# Patient Record
Sex: MALE | Race: WHITE | NOT HISPANIC OR LATINO | Employment: OTHER | URBAN - METROPOLITAN AREA
[De-identification: names, ages, dates, MRNs, and addresses within clinical notes are randomized per-mention and may not be internally consistent; named-entity substitution may affect disease eponyms.]

---

## 2017-02-06 RX ORDER — PIOGLITAZONEHYDROCHLORIDE 30 MG/1
TABLET ORAL
Qty: 90 TABLET | Refills: 3 | Status: ON HOLD | OUTPATIENT
Start: 2017-02-06 | End: 2017-11-17

## 2017-03-31 ENCOUNTER — OFFICE VISIT (OUTPATIENT)
Dept: INTERNAL MEDICINE | Facility: CLINIC | Age: 72
End: 2017-03-31

## 2017-03-31 VITALS
DIASTOLIC BLOOD PRESSURE: 96 MMHG | HEIGHT: 67 IN | SYSTOLIC BLOOD PRESSURE: 156 MMHG | HEART RATE: 79 BPM | WEIGHT: 197 LBS | OXYGEN SATURATION: 97 % | BODY MASS INDEX: 30.92 KG/M2 | TEMPERATURE: 97.8 F

## 2017-03-31 DIAGNOSIS — E11.9 NON-INSULIN DEPENDENT TYPE 2 DIABETES MELLITUS (HCC): ICD-10-CM

## 2017-03-31 DIAGNOSIS — Z00.00 MEDICARE ANNUAL WELLNESS VISIT, INITIAL: Primary | ICD-10-CM

## 2017-03-31 DIAGNOSIS — I10 ESSENTIAL HYPERTENSION: ICD-10-CM

## 2017-03-31 DIAGNOSIS — M25.532 PAIN IN BOTH WRISTS: ICD-10-CM

## 2017-03-31 DIAGNOSIS — E78.49 OTHER HYPERLIPIDEMIA: ICD-10-CM

## 2017-03-31 DIAGNOSIS — R35.1 NOCTURIA: ICD-10-CM

## 2017-03-31 DIAGNOSIS — M48.061 LUMBAR SPINAL STENOSIS: ICD-10-CM

## 2017-03-31 DIAGNOSIS — N52.8 OTHER MALE ERECTILE DYSFUNCTION: ICD-10-CM

## 2017-03-31 DIAGNOSIS — M25.531 PAIN IN BOTH WRISTS: ICD-10-CM

## 2017-03-31 PROCEDURE — 99214 OFFICE O/P EST MOD 30 MIN: CPT | Performed by: INTERNAL MEDICINE

## 2017-03-31 PROCEDURE — G0438 PPPS, INITIAL VISIT: HCPCS | Performed by: INTERNAL MEDICINE

## 2017-03-31 PROCEDURE — 90732 PPSV23 VACC 2 YRS+ SUBQ/IM: CPT | Performed by: INTERNAL MEDICINE

## 2017-03-31 PROCEDURE — G0009 ADMIN PNEUMOCOCCAL VACCINE: HCPCS | Performed by: INTERNAL MEDICINE

## 2017-03-31 RX ORDER — VALSARTAN 320 MG/1
320 TABLET ORAL DAILY
Qty: 90 TABLET | Refills: 3 | Status: SHIPPED | OUTPATIENT
Start: 2017-03-31 | End: 2017-11-20

## 2017-03-31 RX ORDER — VALSARTAN 160 MG/1
160 TABLET ORAL DAILY
Qty: 90 TABLET | Refills: 1 | Status: SHIPPED | OUTPATIENT
Start: 2017-03-31 | End: 2017-03-31

## 2017-03-31 NOTE — PROGRESS NOTES
QUICK REFERENCE INFORMATION:  The ABCs of the Annual Wellness Visit    Initial Medicare Wellness Visit    HEALTH RISK ASSESSMENT    1945    Recent Hospitalizations:  No recent hospitalization(s)..        Current Medical Providers:  Patient Care Team:  Brant Torres MD as PCP - General  Brant Torres MD as PCP - Family Medicine        Smoking Status:  History   Smoking Status   • Former Smoker   Smokeless Tobacco   • Not on file       Alcohol Consumption:  History   Alcohol Use No       Depression Screen:   PHQ-9 Depression Screening 3/31/2017   Little interest or pleasure in doing things 0   Feeling down, depressed, or hopeless 0   Trouble falling or staying asleep, or sleeping too much 0   Feeling tired or having little energy 0   Poor appetite or overeating 0   Feeling bad about yourself - or that you are a failure or have let yourself or your family down 0   Trouble concentrating on things, such as reading the newspaper or watching television 0   Moving or speaking so slowly that other people could have noticed. Or the opposite - being so fidgety or restless that you have been moving around a lot more than usual 0   Thoughts that you would be better off dead, or of hurting yourself in some way 0   PHQ-9 Total Score 0   If you checked off any problems, how difficult have these problems made it for you to do your work, take care of things at home, or get along with other people? Not difficult at all       Health Habits and Functional and Cognitive Screening:  Functional & Cognitive Status 3/31/2017   Do you have difficulty preparing food and eating? No   Do you have difficulty bathing yourself? No   Do you have difficulty getting dressed? No   Do you have difficulty using the toilet? No   Do you have difficulty moving around from place to place? No   In the past year have you fallen or experienced a near fall? No   Do you need help using the phone?  No   Are you deaf or do you have serious difficulty  hearing?  No   Do you need help with transportation? No   Do you need help shopping? No   Do you need help preparing meals?  No   Do you need help with housework?  No   Do you need help with laundry? No   Do you need help taking your medications? No   Do you need help managing money? No   Do you have difficulty concentrating, remembering or making decisions? No       Health Habits  Current Diet: Well Balanced Diet  Dental Exam: Up to date  Eye Exam: Up to date  Exercise (times per week): 4 times per week  Current Exercise Activities Include: Stationary Bicycling/Spin Class          Does the patient have evidence of cognitive impairment? No    Asprin use counseling:yes      Recent Lab Results:    Visual Acuity:  No exam data present    Age-appropriate Screening Schedule:  Refer to the list below for future screening recommendations based on patient's age, sex and/or medical conditions. Orders for these recommended tests are listed in the plan section. The patient has been provided with a written plan.    Health Maintenance   Topic Date Due   • TDAP/TD VACCINES (1 - Tdap) 01/15/1964   • ZOSTER VACCINE  02/10/2016   • PNEUMOCOCCAL VACCINES (65+ LOW/MEDIUM RISK) (2 of 2 - PPSV23) 10/12/2016   • HEMOGLOBIN A1C  05/23/2017   • DIABETIC FOOT EXAM  11/23/2017   • LIPID PANEL  11/23/2017   • DIABETIC EYE EXAM  11/23/2017   • URINE MICROALBUMIN  11/23/2017   • COLONOSCOPY  02/11/2021   • INFLUENZA VACCINE  Addressed        Subjective   History of Present Illness    Jenaro Haji Jr. is a 72 y.o. male who presents for an Annual Wellness Visit.    The following portions of the patient's history were reviewed and updated as appropriate: allergies, current medications, past family history, past medical history, past social history, past surgical history and problem list.    Outpatient Medications Prior to Visit   Medication Sig Dispense Refill   • atorvastatin (LIPITOR) 40 MG tablet TAKE 1 TABLET EVERY DAY AS DIRECTED 90 tablet  "2   • glipiZIDE (GLUCOTROL) 10 MG tablet TAKE 1 TABLET TWICE DAILY 180 tablet 2   • glucose blood test strip 1 each by Other route every day. Test glucose daily 100 each 3   • metFORMIN (GLUCOPHAGE) 1000 MG tablet TAKE 1 TABLET TWICE DAILY WITH MEALS 180 tablet 2   • pioglitazone (ACTOS) 30 MG tablet TAKE 1 TABLET BY MOUTH DAILY. 90 tablet 3   • sildenafil (REVATIO) 20 MG tablet Three to five pills daily as needed 30 tablet 2   • valsartan (DIOVAN) 160 MG tablet Take 1 tablet by mouth Daily. Indications: Make sure he has no allergies to this medication 90 tablet 1     No facility-administered medications prior to visit.        Patient Active Problem List   Diagnosis   • Knee pain   • Pain in both wrists   • Hypertension   • Hyperlipidemia   • Pain of left lower extremity   • Lumbar radiculopathy   • Nail biting   • Disuse syndrome   • Pain in wrist   • Non-insulin dependent type 2 diabetes mellitus   • Lumbar spinal stenosis   • Strain of left knee   • Knee injury   • History of fall   • Muscle spasms of neck   • Former smoker   • ED (erectile dysfunction)   • Medicare annual wellness visit, initial       Advanced Care Planning:  has an advanced directive - a copy has been provided and is in file    Identification of Risk Factors:  Risk factors include: weight  and cardiovascular risk.    Review of Systems   Respiratory: Negative for shortness of breath.    Cardiovascular: Negative for chest pain and palpitations.   Musculoskeletal: Negative for neck pain.   Neurological: Negative for headaches.       Compared to one year ago, the patient feels his physical health is worse.  Compared to one year ago, the patient feels his mental health is worse.    Objective     Physical Exam    Vitals:    03/31/17 1019   BP: 156/96   BP Location: Left arm   Patient Position: Sitting   Cuff Size: Adult   Pulse: 79   Temp: 97.8 °F (36.6 °C)   TempSrc: Tympanic   SpO2: 97%   Weight: 197 lb (89.4 kg)   Height: 67\" (170.2 cm)   PainSc:  "  2       Body mass index is 30.85 kg/(m^2).  Discussed the patient's BMI with him. The BMI is above average; BMI management plan is completed.    Assessment/Plan   Patient Self-Management and Personalized Health Advice  The patient has been provided with information about: diet, exercise, weight management and prevention of cardiac or vascular disease and preventive services including:   · Exercise counseling provided, Nutrition counseling provided.    Visit Diagnoses:    ICD-10-CM ICD-9-CM   1. Medicare annual wellness visit, initial Z00.00 V70.0   2. Essential hypertension I10 401.9   3. Other hyperlipidemia E78.4 272.4   4. Non-insulin dependent type 2 diabetes mellitus E11.9 250.00   5. Other male erectile dysfunction N52.8 607.84   6. Pain in both wrists M25.531 719.43    M25.532    7. Lumbar spinal stenosis M48.06 724.02       No orders of the defined types were placed in this encounter.      Outpatient Encounter Prescriptions as of 3/31/2017   Medication Sig Dispense Refill   • atorvastatin (LIPITOR) 40 MG tablet TAKE 1 TABLET EVERY DAY AS DIRECTED 90 tablet 2   • glipiZIDE (GLUCOTROL) 10 MG tablet TAKE 1 TABLET TWICE DAILY 180 tablet 2   • glucose blood test strip 1 each by Other route every day. Test glucose daily 100 each 3   • metFORMIN (GLUCOPHAGE) 1000 MG tablet TAKE 1 TABLET TWICE DAILY WITH MEALS 180 tablet 2   • pioglitazone (ACTOS) 30 MG tablet TAKE 1 TABLET BY MOUTH DAILY. 90 tablet 3   • sildenafil (REVATIO) 20 MG tablet Three to five pills daily as needed 30 tablet 2   • [DISCONTINUED] valsartan (DIOVAN) 160 MG tablet Take 1 tablet by mouth Daily. Indications: Make sure he has no allergies to this medication 90 tablet 1     No facility-administered encounter medications on file as of 3/31/2017.        Reviewed use of high risk medication in the elderly: yes  Reviewed for potential of harmful drug interactions in the elderly: yes    Follow Up:  No Follow-up on file.     An After Visit Summary and  PPPS with all of these plans were given to the patient.

## 2017-03-31 NOTE — PROGRESS NOTES
Subjective   Jenaro Haji Jr. is a 72 y.o. male.   He is here today for Medicare annual wellness visit initial along with hypertension NIDDM erectile dysfunction and pain in both wrist lumbar spinal stenosis nocturia  History of Present Illness   He is here today for Medicare annual wellness visit initial along with hypertension NIDDM rectal dysfunction and pain in both wrist along with lumbar spinal stenosis and nocturia  The following portions of the patient's history were reviewed and updated as appropriate: allergies, current medications, past family history, past medical history, past social history, past surgical history and problem list.    Review of Systems   Respiratory: Negative for shortness of breath.    Cardiovascular: Negative for chest pain and palpitations.   Musculoskeletal: Positive for arthralgias (ilateral wrist pain) and back pain. Negative for neck pain.   Neurological: Negative for headaches.   All other systems reviewed and are negative.      Objective   Physical Exam   Constitutional: He is oriented to person, place, and time. Vital signs are normal. He appears well-developed and well-nourished. He is active.   HENT:   Head: Normocephalic and atraumatic.   Right Ear: Hearing, tympanic membrane, external ear and ear canal normal.   Left Ear: Hearing, tympanic membrane, external ear and ear canal normal.   Nose: Nose normal.   Mouth/Throat: Uvula is midline, oropharynx is clear and moist and mucous membranes are normal.   Eyes: Conjunctivae, EOM and lids are normal. Pupils are equal, round, and reactive to light. Right eye exhibits no discharge. Left eye exhibits no discharge.   Neck: Trachea normal, normal range of motion, full passive range of motion without pain and phonation normal. Neck supple. Carotid bruit is not present. No edema present. No thyroid mass and no thyromegaly present.   Cardiovascular: Normal rate, regular rhythm, normal heart sounds, intact distal pulses and normal  pulses.  Exam reveals no gallop and no friction rub.    No murmur heard.  Pulmonary/Chest: Effort normal and breath sounds normal. No respiratory distress. He has no wheezes. He has no rales.   Abdominal: Soft. Normal appearance, normal aorta and bowel sounds are normal. He exhibits no distension, no abdominal bruit and no mass. There is no hepatosplenomegaly. There is no tenderness. There is no rebound, no guarding and no CVA tenderness. No hernia. Hernia confirmed negative in the right inguinal area and confirmed negative in the left inguinal area.   Musculoskeletal: Normal range of motion. He exhibits no edema or tenderness.       Vascular Status -  His exam exhibits right foot vasculature normal. His exam exhibits no right foot edema. His exam exhibits left foot vasculature normal. His exam exhibits no left foot edema.   Skin Integrity  -  His right foot skin is intact.     Eason 's left foot skin is intact. .  Lymphadenopathy:     He has no cervical adenopathy.     He has no axillary adenopathy.        Right: No inguinal and no supraclavicular adenopathy present.        Left: No inguinal and no supraclavicular adenopathy present.   Neurological: He is alert and oriented to person, place, and time. He has normal strength. No cranial nerve deficit or sensory deficit. He exhibits normal muscle tone. He displays a negative Romberg sign. Coordination normal.   Skin: Skin is warm, dry and intact. No cyanosis. Nails show no clubbing.   Psychiatric: He has a normal mood and affect. His speech is normal and behavior is normal. Judgment and thought content normal. Cognition and memory are normal.   Nursing note and vitals reviewed.      Assessment/Plan   Diagnoses and all orders for this visit:    Medicare annual wellness visit, initial  -     Lipid Panel With LDL / HDL Ratio  -     Hemoglobin A1c  -     MicroAlbumin, Urine, Random  -     TSH  -     T4, Free  -     Urinalysis With Microscopic  -     Comprehensive  Metabolic Panel  -     CBC & Differential    Essential hypertension  -     Lipid Panel With LDL / HDL Ratio  -     Hemoglobin A1c  -     MicroAlbumin, Urine, Random  -     TSH  -     T4, Free  -     Urinalysis With Microscopic  -     Comprehensive Metabolic Panel  -     CBC & Differential    Other hyperlipidemia  -     Lipid Panel With LDL / HDL Ratio  -     Hemoglobin A1c  -     MicroAlbumin, Urine, Random  -     TSH  -     T4, Free  -     Urinalysis With Microscopic  -     Comprehensive Metabolic Panel  -     CBC & Differential    Non-insulin dependent type 2 diabetes mellitus  -     Lipid Panel With LDL / HDL Ratio  -     Hemoglobin A1c  -     MicroAlbumin, Urine, Random  -     TSH  -     T4, Free  -     Urinalysis With Microscopic  -     Comprehensive Metabolic Panel  -     CBC & Differential    Other male erectile dysfunction  -     Lipid Panel With LDL / HDL Ratio  -     Hemoglobin A1c  -     MicroAlbumin, Urine, Random  -     TSH  -     T4, Free  -     Urinalysis With Microscopic  -     Comprehensive Metabolic Panel  -     CBC & Differential    Pain in both wrists  -     Lipid Panel With LDL / HDL Ratio  -     Hemoglobin A1c  -     MicroAlbumin, Urine, Random  -     TSH  -     T4, Free  -     Urinalysis With Microscopic  -     Comprehensive Metabolic Panel  -     CBC & Differential    Lumbar spinal stenosis  -     Lipid Panel With LDL / HDL Ratio  -     Hemoglobin A1c  -     MicroAlbumin, Urine, Random  -     TSH  -     T4, Free  -     Urinalysis With Microscopic  -     Comprehensive Metabolic Panel  -     CBC & Differential    Nocturia  -     PSA    Other orders  -     valsartan (DIOVAN) 320 MG tablet; Take 1 tablet by mouth Daily. Indications: Make sure he has no allergies to this medication  -     Pneumococcal Polysaccharide Vaccine 23-Valent Greater Than or Equal To 1yo Subcutaneous / IM      Medicare annual is initial follow recommendation  Lumbar spinal stenosis supportive meds physical  therapy  Nocturia check PSA  Pain in both wrists follow with hand surgery  Erectile dysfunction supportive meds  NIDDM follow hemoglobin A1 C left most visits  Hyperlipidemia keep LDL less than 70 with proper diet excess movement medication   hypertension medication adjustment

## 2017-04-01 LAB
ALBUMIN SERPL-MCNC: 4.7 G/DL (ref 3.5–5.2)
ALBUMIN/GLOB SERPL: 2.2 G/DL
ALP SERPL-CCNC: 41 U/L (ref 39–117)
ALT SERPL-CCNC: 22 U/L (ref 1–41)
APPEARANCE UR: CLEAR
AST SERPL-CCNC: 23 U/L (ref 1–40)
BACTERIA #/AREA URNS HPF: NORMAL /HPF
BASOPHILS # BLD AUTO: 0.04 10*3/MM3 (ref 0–0.2)
BASOPHILS NFR BLD AUTO: 0.8 % (ref 0–1.5)
BILIRUB SERPL-MCNC: 0.6 MG/DL (ref 0.1–1.2)
BILIRUB UR QL STRIP: NEGATIVE
BUN SERPL-MCNC: 15 MG/DL (ref 8–23)
BUN/CREAT SERPL: 18.1 (ref 7–25)
CALCIUM SERPL-MCNC: 9 MG/DL (ref 8.6–10.5)
CASTS URNS MICRO: NORMAL
CHLORIDE SERPL-SCNC: 100 MMOL/L (ref 98–107)
CHOLEST SERPL-MCNC: 139 MG/DL (ref 0–200)
CO2 SERPL-SCNC: 26.9 MMOL/L (ref 22–29)
COLOR UR: YELLOW
CREAT SERPL-MCNC: 0.83 MG/DL (ref 0.76–1.27)
EOSINOPHIL # BLD AUTO: 0.2 10*3/MM3 (ref 0–0.7)
EOSINOPHIL NFR BLD AUTO: 4.1 % (ref 0.3–6.2)
EPI CELLS #/AREA URNS HPF: NORMAL /HPF
ERYTHROCYTE [DISTWIDTH] IN BLOOD BY AUTOMATED COUNT: 14 % (ref 11.5–14.5)
GLOBULIN SER CALC-MCNC: 2.1 GM/DL
GLUCOSE SERPL-MCNC: 129 MG/DL (ref 65–99)
GLUCOSE UR QL: NEGATIVE
HBA1C MFR BLD: 6.2 % (ref 4.8–5.6)
HCT VFR BLD AUTO: 42.9 % (ref 40.4–52.2)
HDLC SERPL-MCNC: 57 MG/DL (ref 40–60)
HGB BLD-MCNC: 13.9 G/DL (ref 13.7–17.6)
HGB UR QL STRIP: NEGATIVE
IMM GRANULOCYTES # BLD: 0 10*3/MM3 (ref 0–0.03)
IMM GRANULOCYTES NFR BLD: 0 % (ref 0–0.5)
KETONES UR QL STRIP: NEGATIVE
LDLC SERPL CALC-MCNC: 63 MG/DL (ref 0–100)
LDLC/HDLC SERPL: 1.1 {RATIO}
LEUKOCYTE ESTERASE UR QL STRIP: NEGATIVE
LYMPHOCYTES # BLD AUTO: 1.4 10*3/MM3 (ref 0.9–4.8)
LYMPHOCYTES NFR BLD AUTO: 28.6 % (ref 19.6–45.3)
MCH RBC QN AUTO: 30.7 PG (ref 27–32.7)
MCHC RBC AUTO-ENTMCNC: 32.4 G/DL (ref 32.6–36.4)
MCV RBC AUTO: 94.7 FL (ref 79.8–96.2)
MICROALBUMIN UR-MCNC: <3 UG/ML
MONOCYTES # BLD AUTO: 0.35 10*3/MM3 (ref 0.2–1.2)
MONOCYTES NFR BLD AUTO: 7.1 % (ref 5–12)
NEUTROPHILS # BLD AUTO: 2.91 10*3/MM3 (ref 1.9–8.1)
NEUTROPHILS NFR BLD AUTO: 59.4 % (ref 42.7–76)
NITRITE UR QL STRIP: NEGATIVE
PH UR STRIP: 5.5 [PH] (ref 5–8)
PLATELET # BLD AUTO: 227 10*3/MM3 (ref 140–500)
POTASSIUM SERPL-SCNC: 4.6 MMOL/L (ref 3.5–5.2)
PROT SERPL-MCNC: 6.8 G/DL (ref 6–8.5)
PROT UR QL STRIP: NEGATIVE
PSA SERPL-MCNC: 0.85 NG/ML (ref 0–4)
RBC # BLD AUTO: 4.53 10*6/MM3 (ref 4.6–6)
RBC #/AREA URNS HPF: NORMAL /HPF
SODIUM SERPL-SCNC: 140 MMOL/L (ref 136–145)
SP GR UR: 1.02 (ref 1–1.03)
T4 FREE SERPL-MCNC: 1.13 NG/DL (ref 0.93–1.7)
TRIGL SERPL-MCNC: 96 MG/DL (ref 0–150)
TSH SERPL DL<=0.005 MIU/L-ACNC: 6.07 MIU/ML (ref 0.27–4.2)
UROBILINOGEN UR STRIP-MCNC: (no result) MG/DL
VLDLC SERPL CALC-MCNC: 19.2 MG/DL (ref 5–40)
WBC # BLD AUTO: 4.9 10*3/MM3 (ref 4.5–10.7)
WBC #/AREA URNS HPF: NORMAL /HPF

## 2017-04-03 RX ORDER — ATORVASTATIN CALCIUM 40 MG/1
TABLET, FILM COATED ORAL
Qty: 90 TABLET | Refills: 2 | Status: ON HOLD | OUTPATIENT
Start: 2017-04-03 | End: 2017-11-17

## 2017-06-19 RX ORDER — GLIPIZIDE 10 MG/1
TABLET ORAL
Qty: 180 TABLET | Refills: 2 | Status: SHIPPED | OUTPATIENT
Start: 2017-06-19

## 2017-07-10 RX ORDER — BLOOD-GLUCOSE METER
KIT MISCELLANEOUS
Qty: 100 EACH | Refills: 3 | Status: SHIPPED | OUTPATIENT
Start: 2017-07-10

## 2017-10-20 ENCOUNTER — TRANSCRIBE ORDERS (OUTPATIENT)
Dept: GASTROENTEROLOGY | Facility: CLINIC | Age: 72
End: 2017-10-20

## 2017-10-20 DIAGNOSIS — Z86.010 HX OF COLONIC POLYPS: Primary | ICD-10-CM

## 2017-10-24 PROBLEM — Z86.0100 HX OF COLONIC POLYPS: Status: ACTIVE | Noted: 2017-10-24

## 2017-10-24 PROBLEM — Z86.010 HX OF COLONIC POLYPS: Status: ACTIVE | Noted: 2017-10-24

## 2017-11-17 ENCOUNTER — ANESTHESIA (OUTPATIENT)
Dept: GASTROENTEROLOGY | Facility: HOSPITAL | Age: 72
End: 2017-11-17

## 2017-11-17 ENCOUNTER — ANESTHESIA EVENT (OUTPATIENT)
Dept: GASTROENTEROLOGY | Facility: HOSPITAL | Age: 72
End: 2017-11-17

## 2017-11-17 ENCOUNTER — HOSPITAL ENCOUNTER (OUTPATIENT)
Facility: HOSPITAL | Age: 72
Setting detail: HOSPITAL OUTPATIENT SURGERY
Discharge: HOME | End: 2017-11-17
Attending: INTERNAL MEDICINE | Admitting: INTERNAL MEDICINE

## 2017-11-17 VITALS
HEART RATE: 90 BPM | HEIGHT: 67 IN | RESPIRATION RATE: 16 BRPM | TEMPERATURE: 97.7 F | SYSTOLIC BLOOD PRESSURE: 123 MMHG | WEIGHT: 178 LBS | BODY MASS INDEX: 27.94 KG/M2 | OXYGEN SATURATION: 98 % | DIASTOLIC BLOOD PRESSURE: 80 MMHG

## 2017-11-17 DIAGNOSIS — Z86.010 HX OF COLONIC POLYPS: ICD-10-CM

## 2017-11-17 LAB — GLUCOSE BLDC GLUCOMTR-MCNC: 177 MG/DL (ref 70–130)

## 2017-11-17 PROCEDURE — 82962 GLUCOSE BLOOD TEST: CPT

## 2017-11-17 PROCEDURE — 25010000002 PROPOFOL 10 MG/ML EMULSION: Performed by: ANESTHESIOLOGY

## 2017-11-17 PROCEDURE — 88305 TISSUE EXAM BY PATHOLOGIST: CPT | Performed by: INTERNAL MEDICINE

## 2017-11-17 PROCEDURE — 45380 COLONOSCOPY AND BIOPSY: CPT | Performed by: INTERNAL MEDICINE

## 2017-11-17 PROCEDURE — S0260 H&P FOR SURGERY: HCPCS | Performed by: INTERNAL MEDICINE

## 2017-11-17 RX ORDER — SODIUM CHLORIDE 0.9 % (FLUSH) 0.9 %
1-10 SYRINGE (ML) INJECTION AS NEEDED
Status: DISCONTINUED | OUTPATIENT
Start: 2017-11-17 | End: 2017-11-17 | Stop reason: HOSPADM

## 2017-11-17 RX ORDER — PROPOFOL 10 MG/ML
VIAL (ML) INTRAVENOUS AS NEEDED
Status: DISCONTINUED | OUTPATIENT
Start: 2017-11-17 | End: 2017-11-17 | Stop reason: SURG

## 2017-11-17 RX ORDER — SODIUM CHLORIDE, SODIUM LACTATE, POTASSIUM CHLORIDE, CALCIUM CHLORIDE 600; 310; 30; 20 MG/100ML; MG/100ML; MG/100ML; MG/100ML
30 INJECTION, SOLUTION INTRAVENOUS CONTINUOUS PRN
Status: DISCONTINUED | OUTPATIENT
Start: 2017-11-17 | End: 2017-11-17 | Stop reason: HOSPADM

## 2017-11-17 RX ORDER — LIDOCAINE HYDROCHLORIDE 20 MG/ML
INJECTION, SOLUTION INFILTRATION; PERINEURAL AS NEEDED
Status: DISCONTINUED | OUTPATIENT
Start: 2017-11-17 | End: 2017-11-17 | Stop reason: SURG

## 2017-11-17 RX ORDER — PROPOFOL 10 MG/ML
VIAL (ML) INTRAVENOUS CONTINUOUS PRN
Status: DISCONTINUED | OUTPATIENT
Start: 2017-11-17 | End: 2017-11-17 | Stop reason: SURG

## 2017-11-17 RX ORDER — LEVOTHYROXINE SODIUM 0.05 MG/1
50 TABLET ORAL DAILY
COMMUNITY
End: 2018-01-17 | Stop reason: SDUPTHER

## 2017-11-17 RX ORDER — ASPIRIN 81 MG/1
81 TABLET ORAL DAILY
COMMUNITY

## 2017-11-17 RX ORDER — ATORVASTATIN CALCIUM 40 MG/1
40 TABLET, FILM COATED ORAL DAILY
COMMUNITY

## 2017-11-17 RX ORDER — LANOLIN ALCOHOL/MO/W.PET/CERES
100 CREAM (GRAM) TOPICAL DAILY
COMMUNITY

## 2017-11-17 RX ADMIN — PROPOFOL 100 MCG/KG/MIN: 10 INJECTION, EMULSION INTRAVENOUS at 08:57

## 2017-11-17 RX ADMIN — LIDOCAINE HYDROCHLORIDE 60 MG: 20 INJECTION, SOLUTION INFILTRATION; PERINEURAL at 08:57

## 2017-11-17 RX ADMIN — SODIUM CHLORIDE, POTASSIUM CHLORIDE, SODIUM LACTATE AND CALCIUM CHLORIDE 30 ML/HR: 600; 310; 30; 20 INJECTION, SOLUTION INTRAVENOUS at 08:18

## 2017-11-17 RX ADMIN — PROPOFOL 100 MG: 10 INJECTION, EMULSION INTRAVENOUS at 08:57

## 2017-11-17 NOTE — H&P
[facility]  Pre Procedure History & Physical    Chief Complaint:   History colon polyps    Subjective     HPI:   Patient 72-year-old male with history diabetes hyperlipidemia hypertension with history of colon polyps.  Patient reports last colonoscopy 2011 here for colonoscopy.    Past Medical History:   Past Medical History:   Diagnosis Date   • Diabetes mellitus    • Hyperlipidemia    • Hypertension    • Lumbar spinal stenosis    • Nocturia    • Non-insulin dependent type 2 diabetes mellitus    • Pain in both wrists        Past Surgical History:  Past Surgical History:   Procedure Laterality Date   • COLONOSCOPY  03/18/2011   • HERNIA REPAIR     • INGUINAL HERNIA REPAIR         Family History:  Family History   Problem Relation Age of Onset   • Cancer Mother    • Aortic aneurysm Father      Abdominal   • Coronary artery disease Father        Social History:   reports that he has quit smoking. He has never used smokeless tobacco. He reports that he drinks alcohol. He reports that he does not use illicit drugs.    Medications:   Prescriptions Prior to Admission   Medication Sig Dispense Refill Last Dose   • aspirin 81 MG EC tablet Take 81 mg by mouth Daily.   11/15/2017   • atorvastatin (LIPITOR) 40 MG tablet Take 40 mg by mouth Daily.   11/16/2017 at 0900   • glipiZIDE (GLUCOTROL) 10 MG tablet TAKE 1 TABLET TWICE DAILY 180 tablet 2 11/16/2017 at 0900   • levothyroxine (SYNTHROID, LEVOTHROID) 50 MCG tablet Take 50 mcg by mouth Daily.   11/16/2017 at 0900   • metFORMIN (GLUCOPHAGE) 1000 MG tablet TAKE 1 TABLET TWICE DAILY WITH MEALS 180 tablet 2 11/16/2017 at 0900   • Multiple Vitamins-Minerals (CENTRUM SILVER 50+MEN) tablet Take  by mouth.   11/15/2017   • RELION CONFIRM/MICRO TEST test strip CHECK BLOOD SUGAR ONCE DAILY 100 each 3 11/16/2017 at Unknown time   • valsartan (DIOVAN) 320 MG tablet Take 1 tablet by mouth Daily. Indications: Make sure he has no allergies to this medication 90  "tablet 3 11/16/2017 at 0900   • vitamin B-6 (PYRIDOXINE) 50 MG tablet Take 100 mg by mouth Daily.   11/15/2017       Allergies:  Blue dyes (parenteral); Contrast dye; and Red dye    ROS:    Pertinent items are noted in HPI     Objective     Blood pressure 144/98, pulse 89, temperature 98.1 °F (36.7 °C), temperature source Oral, resp. rate 16, height 67\" (170.2 cm), weight 178 lb (80.7 kg), SpO2 97 %.    Physical Exam   Constitutional: Pt is oriented to person, place, and time and well-developed, well-nourished, and in no distress.   Mouth/Throat: Oropharynx is clear and moist.   Neck: Normal range of motion.   Cardiovascular: Normal rate, regular rhythm and normal heart sounds.    Pulmonary/Chest: Effort normal and breath sounds normal.   Abdominal: Soft. Nontender  Skin: Skin is warm and dry.   Psychiatric: Mood, memory, affect and judgment normal.     Assessment/Plan     Diagnosis:  History colon polyps      Anticipated Surgical Procedure:  Colonoscopy    The risks, benefits, and alternatives of this procedure have been discussed with the patient or the responsible party- the patient understands and agrees to proceed.                                                          "

## 2017-11-17 NOTE — ANESTHESIA PREPROCEDURE EVALUATION
Anesthesia Evaluation            Airway   Mallampati: III  TM distance: >3 FB  Neck ROM: full  no difficulty expected  Dental - normal exam     Pulmonary - normal exam   Cardiovascular - normal exam    (+) hypertension,       Neuro/Psych  GI/Hepatic/Renal/Endo    (+)  diabetes mellitus type 2,     Musculoskeletal     Abdominal  - normal exam    Bowel sounds: normal.   Substance History      OB/GYN          Other                                        Anesthesia Plan    ASA 3     MAC     Anesthetic plan and risks discussed with patient.

## 2017-11-17 NOTE — BRIEF OP NOTE
COLONOSCOPY  Progress Note    Jenaro Haji Jr.  11/17/2017    Pre-op Diagnosis:   Hx of colonic polyps [Z86.010]       Post-Op Diagnosis Codes:     * Colon polyps [K63.5]     * Diverticulosis [K57.90]     * External hemorrhoids without complication [K64.4]    Procedure/CPT® Codes:      Procedure(s):  COLONOSCOPY TO CECUM/TI WITH POLYPECTOMY ( COLD BX)    Surgeon(s):  Kenan Galindo MD    Anesthesia: Monitor Anesthesia Care    Staff:   Endo Technician: Shadia Mancini  Orientee: Zari Marie  Endo Nurse: Angelia Valentin RN    Estimated Blood Loss: minimal    Urine Voided: * No values recorded between 11/17/2017  8:57 AM and 11/17/2017  9:30 AM *    Specimens:                  ID Type Source Tests Collected by Time Destination   A : TRANSVERSE COLON POLYPS X3( COLD BX) Polyp Large Intestine, Transverse Colon TISSUE EXAM Kenan Galindo MD 11/17/2017 0920          Drains:           Findings: 72 male history colon polyps  Transverse polyps  Diverticulosis  External hemorrhoids    Complications: None      Kenan Galindo MD     Date: 11/17/2017  Time: 9:30 AM

## 2017-11-17 NOTE — DISCHARGE INSTRUCTIONS
If you do not get your pathology results within 2 weeks, call Dr. Galindo at [phone]  Repeat colonoscopy in 5 years

## 2017-11-17 NOTE — ANESTHESIA POSTPROCEDURE EVALUATION
"Patient: Jenaro Haji Jr.    Procedure Summary     Date Anesthesia Start Anesthesia Stop Room / Location    11/17/17 0855 0935  [facility]       Procedure Diagnosis Surgeon Provider    COLONOSCOPY TO CECUM/TI WITH POLYPECTOMY ( COLD BX) (N/A ) Colon polyps; Diverticulosis; External hemorrhoids without complication  (Hx of colonic polyps [Z86.010]) MD Vonnie Amezquita MD          Anesthesia Type: MAC  Last vitals  BP   117/87 (11/17/17 0933)   Temp   36.7 °C (98.1 °F) (11/17/17 0801)   Pulse   99 (11/17/17 0933)   Resp   15 (11/17/17 0933)     SpO2   99 % (11/17/17 0933)     Post Anesthesia Care and Evaluation    Patient location during evaluation: bedside  Patient participation: complete - patient participated  Level of consciousness: awake  Pain management: adequate  Airway patency: patent  Anesthetic complications: No anesthetic complications    Cardiovascular status: acceptable  Respiratory status: acceptable  Hydration status: acceptable    Comments: /87 (BP Location: Left arm, Patient Position: Lying)  Pulse 99  Temp 36.7 °C (98.1 °F) (Oral)   Resp 15  Ht 67\" (170.2 cm)  Wt 178 lb (80.7 kg)  SpO2 99%  BMI 27.88 kg/m2        "

## 2017-11-17 NOTE — PLAN OF CARE
Problem: Patient Care Overview (Adult)  Goal: Plan of Care Review  Outcome: Ongoing (interventions implemented as appropriate)    11/17/17 0758   Coping/Psychosocial Response Interventions   Plan Of Care Reviewed With patient   Patient Care Overview   Progress improving   Outcome Evaluation   Outcome Summary/Follow up Plan vss, ready for or       Goal: Adult Individualization and Mutuality  Outcome: Ongoing (interventions implemented as appropriate)    11/17/17 0758   Individualization   Patient Specific Preferences goes by skip       Goal: Discharge Needs Assessment  Outcome: Ongoing (interventions implemented as appropriate)    11/17/17 0758   Discharge Needs Assessment   Concerns To Be Addressed denies needs/concerns at this time   Discharge Disposition home or self-care   Living Environment   Transportation Available car;family or friend will provide         Problem: GI Endoscopy (Adult)  Intervention: Monitor/Manage Procedure Recovery    11/17/17 0758   Respiratory Interventions   Airway/Ventilation Management airway patency maintained   Coping/Psychosocial Interventions   Environmental Support calm environment promoted   Activity   Activity Type activity adjusted per tolerance   Cardiac Interventions   Warming Thermoregulation Maintenance warm blankets applied       Intervention: Prevent Crystal-procedural Injury    11/17/17 0758   Positioning   Positioning side lying, left   Head Of Bed (HOB) Position HOB elevated         Goal: Signs and Symptoms of Listed Potential Problems Will be Absent or Manageable (GI Endoscopy)  Outcome: Ongoing (interventions implemented as appropriate)    11/17/17 0758   GI Endoscopy   Problems Assessed (GI Endoscopy) all   Problems Present (GI Endoscopy) none

## 2017-11-20 ENCOUNTER — OFFICE VISIT (OUTPATIENT)
Dept: CARDIOLOGY | Facility: CLINIC | Age: 72
End: 2017-11-20

## 2017-11-20 VITALS
HEIGHT: 67 IN | BODY MASS INDEX: 28.09 KG/M2 | DIASTOLIC BLOOD PRESSURE: 80 MMHG | WEIGHT: 179 LBS | SYSTOLIC BLOOD PRESSURE: 130 MMHG | HEART RATE: 93 BPM

## 2017-11-20 DIAGNOSIS — E78.49 OTHER HYPERLIPIDEMIA: ICD-10-CM

## 2017-11-20 DIAGNOSIS — R06.09 DYSPNEA ON EXERTION: ICD-10-CM

## 2017-11-20 DIAGNOSIS — E11.9 NON-INSULIN DEPENDENT TYPE 2 DIABETES MELLITUS (HCC): ICD-10-CM

## 2017-11-20 DIAGNOSIS — I10 ESSENTIAL HYPERTENSION: Primary | ICD-10-CM

## 2017-11-20 DIAGNOSIS — R53.83 FATIGUE, UNSPECIFIED TYPE: ICD-10-CM

## 2017-11-20 LAB
CYTO UR: NORMAL
LAB AP CASE REPORT: NORMAL
Lab: NORMAL
PATH REPORT.FINAL DX SPEC: NORMAL
PATH REPORT.GROSS SPEC: NORMAL

## 2017-11-20 PROCEDURE — 99204 OFFICE O/P NEW MOD 45 MIN: CPT | Performed by: INTERNAL MEDICINE

## 2017-11-20 PROCEDURE — 93000 ELECTROCARDIOGRAM COMPLETE: CPT | Performed by: INTERNAL MEDICINE

## 2017-11-20 RX ORDER — VALSARTAN AND HYDROCHLOROTHIAZIDE 320; 25 MG/1; MG/1
1 TABLET, FILM COATED ORAL DAILY
COMMUNITY
End: 2019-12-19

## 2017-11-20 NOTE — PROGRESS NOTES
Date of Office Visit: 2017  Encounter Provider: Titi Richardson MD  Place of Service: [facility] CARDIOLOGY  Patient Name: Jenaro Haji Jr.  :1945  [id number]    Chief Complaint   Patient presents with   • Hypertension   • Hyperlipidemia   :     HPI: Jenaro Haji Jr. is a 72 y.o. male  He is here as a new patient today.  He is a 72-year-old gentleman, a former smoker who stopped 10 years ago.  He has diabetes which is pretty well controlled.  He has hyperlipidemia which is very well controlled and hypertension which is well controlled.  He has never had coronary disease.  He gets some fatigue and a little bit of shortness of breath when he is cutting the grass or doing things, but he is not sure if that is just age related or if it is more than that.  He does not have angina.  He is not having syncope, PND, orthopnea, edema.  He does not have any history of lung, kidney problems or bleeding difficulty.          Past Medical History:   Diagnosis Date   • Chronic kidney disease    • Diabetes mellitus    • Hyperlipidemia    • Hypertension    • Kidney stone    • Lumbar spinal stenosis    • Nocturia    • Non-insulin dependent type 2 diabetes mellitus    • Pain in both wrists    • Thyroid disease        Past Surgical History:   Procedure Laterality Date   • COLONOSCOPY  2011   • COLONOSCOPY N/A 2017    Procedure: COLONOSCOPY TO CECUM/TI WITH POLYPECTOMY ( COLD BX);  Surgeon: Kenan Galindo MD;  Location: [facility] ENDOSCOPY;  Service:    • HERNIA REPAIR     • INGUINAL HERNIA REPAIR         Social History     Social History   • Marital status:      Spouse name: N/A   • Number of children: N/A   • Years of education: N/A     Occupational History   • Not on file.     Social History Main Topics   • Smoking status: Former Smoker   • Smokeless tobacco: Never Used   • Alcohol use Yes      Comment: weekly - moderate   • Drug use: No   • Sexual activity: Defer      Other Topics Concern   • Not on file     Social History Narrative       Family History   Problem Relation Age of Onset   • Cancer Mother    • Diabetes Mother    • Aortic aneurysm Father      Abdominal   • Coronary artery disease Father    • Heart attack Father    • Hypertension Father    • Sudden death Father    • Stroke Paternal Uncle        Review of Systems   Constitution: Negative for decreased appetite, fever, malaise/fatigue and weight loss.   HENT: Negative for nosebleeds.    Eyes: Negative for double vision.   Cardiovascular: Negative for chest pain, claudication, cyanosis, dyspnea on exertion, irregular heartbeat, leg swelling, near-syncope, orthopnea, palpitations, paroxysmal nocturnal dyspnea and syncope.   Respiratory: Negative for cough, hemoptysis and shortness of breath.    Hematologic/Lymphatic: Negative for bleeding problem.   Skin: Negative for rash.   Musculoskeletal: Negative for falls and myalgias.   Gastrointestinal: Negative for hematochezia, jaundice, melena, nausea and vomiting.   Genitourinary: Negative for hematuria.   Neurological: Negative for dizziness and seizures.   Psychiatric/Behavioral: Negative for altered mental status and memory loss.       Allergies   Allergen Reactions   • Blue Dyes (Parenteral)    • Contrast Dye    • Red Dye          Current Outpatient Prescriptions:   •  aspirin 81 MG EC tablet, Take 81 mg by mouth Daily., Disp: , Rfl:   •  atorvastatin (LIPITOR) 40 MG tablet, Take 40 mg by mouth Daily., Disp: , Rfl:   •  glipiZIDE (GLUCOTROL) 10 MG tablet, TAKE 1 TABLET TWICE DAILY, Disp: 180 tablet, Rfl: 2  •  levothyroxine (SYNTHROID, LEVOTHROID) 50 MCG tablet, Take 50 mcg by mouth Daily., Disp: , Rfl:   •  metFORMIN (GLUCOPHAGE) 1000 MG tablet, TAKE 1 TABLET TWICE DAILY WITH MEALS, Disp: 180 tablet, Rfl: 2  •  Multiple Vitamins-Minerals (CENTRUM SILVER 50+MEN) tablet, Take  by mouth., Disp: , Rfl:   •  RELION CONFIRM/MICRO TEST test strip, CHECK BLOOD SUGAR ONCE  "DAILY, Disp: 100 each, Rfl: 3  •  valsartan-hydrochlorothiazide (DIOVAN-HCT) 320-25 MG per tablet, Take 1 tablet by mouth Daily., Disp: , Rfl:   •  vitamin B-6 (PYRIDOXINE) 50 MG tablet, Take 100 mg by mouth Daily., Disp: , Rfl:      Objective:     Vitals:    11/20/17 1529   BP: 130/80   Pulse: 93   Weight: 179 lb (81.2 kg)   Height: 67\" (170.2 cm)     Body mass index is 28.04 kg/(m^2).    Physical Exam   Constitutional: He is oriented to person, place, and time. He appears well-developed and well-nourished.   HENT:   Head: Normocephalic.   Eyes: No scleral icterus.   Neck: No JVD present. No thyromegaly present.   Cardiovascular: Normal rate, regular rhythm and normal heart sounds.  Exam reveals no gallop and no friction rub.    No murmur heard.  Pulmonary/Chest: Effort normal and breath sounds normal. He has no wheezes. He has no rales.   Abdominal: Soft. There is no hepatosplenomegaly. There is no tenderness.   Musculoskeletal: Normal range of motion. He exhibits no edema.   Lymphadenopathy:     He has no cervical adenopathy.   Neurological: He is alert and oriented to person, place, and time.   Skin: Skin is warm and dry. No rash noted.   Psychiatric: He has a normal mood and affect.         ECG 12 Lead  Date/Time: 11/20/2017 4:08 PM  Performed by: JOANNA CHRISTINA  Authorized by: JOANNA CHRISTINA   Rhythm: sinus rhythm  Clinical impression: normal ECG           No prior to compare  Assessment:       Diagnosis Plan   1. Essential hypertension  Treadmill Stress Test   2. Other hyperlipidemia  Treadmill Stress Test   3. Non-insulin dependent type 2 diabetes mellitus  Treadmill Stress Test   4. Dyspnea on exertion  Treadmill Stress Test   5. Fatigue, unspecified type  Treadmill Stress Test          Plan:       This is always a difficult situation.  This is a gentleman who has multiple cardiac risk factors, some fatigue, and some shortness of breath.  This could be coronary disease but it might not be.  It is hard to " tell.  I do not think that it is unreasonable to get a regular treadmill stress test on him.  His ECG is normal.  His activity level is decent.  We will see how he does and we will base further decisions on what we find with that.         As always, it has been a pleasure to participate in your patient's care.      Sincerely,       Titi Richardson MD                  Answers for HPI/ROS submitted by the patient on 11/15/2017   Hypertension  Chronicity: chronic  Onset: more than 1 month ago  Progression since onset: resolved  Condition status: controlled  Agents associated with hypertension: NSAIDs, thyroid hormones  CAD risks: diabetes mellitus  Compliance problems: no compliance problems

## 2017-12-07 ENCOUNTER — HOSPITAL ENCOUNTER (OUTPATIENT)
Dept: CARDIOLOGY | Facility: HOSPITAL | Age: 72
Discharge: HOME | End: 2017-12-07
Attending: INTERNAL MEDICINE | Admitting: INTERNAL MEDICINE

## 2017-12-07 DIAGNOSIS — E11.9 NON-INSULIN DEPENDENT TYPE 2 DIABETES MELLITUS (HCC): ICD-10-CM

## 2017-12-07 DIAGNOSIS — R53.83 FATIGUE, UNSPECIFIED TYPE: ICD-10-CM

## 2017-12-07 DIAGNOSIS — I10 ESSENTIAL HYPERTENSION: ICD-10-CM

## 2017-12-07 DIAGNOSIS — E78.49 OTHER HYPERLIPIDEMIA: ICD-10-CM

## 2017-12-07 DIAGNOSIS — R06.09 DYSPNEA ON EXERTION: ICD-10-CM

## 2017-12-07 LAB
BH CV STRESS BP STAGE 1: NORMAL
BH CV STRESS BP STAGE 2: NORMAL
BH CV STRESS BP STAGE 3: NORMAL
BH CV STRESS DURATION MIN STAGE 1: 3
BH CV STRESS DURATION MIN STAGE 2: 3
BH CV STRESS DURATION MIN STAGE 3: 3
BH CV STRESS DURATION MIN STAGE 4: 0
BH CV STRESS DURATION SEC STAGE 1: 0
BH CV STRESS DURATION SEC STAGE 2: 0
BH CV STRESS DURATION SEC STAGE 3: 0
BH CV STRESS DURATION SEC STAGE 4: 10
BH CV STRESS GRADE STAGE 1: 10
BH CV STRESS GRADE STAGE 2: 12
BH CV STRESS GRADE STAGE 3: 14
BH CV STRESS GRADE STAGE 4: 16
BH CV STRESS HR STAGE 1: 117
BH CV STRESS HR STAGE 2: 133
BH CV STRESS HR STAGE 3: 140
BH CV STRESS HR STAGE 4: 140
BH CV STRESS METS STAGE 1: 5
BH CV STRESS METS STAGE 2: 7.5
BH CV STRESS METS STAGE 3: 10
BH CV STRESS METS STAGE 4: 13.5
BH CV STRESS PROTOCOL 1: NORMAL
BH CV STRESS RECOVERY BP: NORMAL MMHG
BH CV STRESS RECOVERY HR: 97 BPM
BH CV STRESS SPEED STAGE 1: 1.7
BH CV STRESS SPEED STAGE 2: 2.5
BH CV STRESS SPEED STAGE 3: 3.4
BH CV STRESS SPEED STAGE 4: 4.2
BH CV STRESS STAGE 1: 1
BH CV STRESS STAGE 2: 2
BH CV STRESS STAGE 3: 3
BH CV STRESS STAGE 4: 4
MAXIMAL PREDICTED HEART RATE: 147 BPM
PERCENT MAX PREDICTED HR: 95.24 %
STRESS BASELINE BP: NORMAL MMHG
STRESS BASELINE HR: 80 BPM
STRESS PERCENT HR: 112 %
STRESS POST ESTIMATED WORKLOAD: 10 METS
STRESS POST EXERCISE DUR MIN: 9 MIN
STRESS POST EXERCISE DUR SEC: 10 SEC
STRESS POST PEAK BP: NORMAL MMHG
STRESS POST PEAK HR: 140 BPM
STRESS TARGET HR: 125 BPM

## 2017-12-07 PROCEDURE — 93017 CV STRESS TEST TRACING ONLY: CPT

## 2017-12-07 PROCEDURE — 93018 CV STRESS TEST I&R ONLY: CPT | Performed by: INTERNAL MEDICINE

## 2017-12-07 PROCEDURE — 93016 CV STRESS TEST SUPVJ ONLY: CPT | Performed by: INTERNAL MEDICINE

## 2017-12-08 ENCOUNTER — TELEPHONE (OUTPATIENT)
Dept: CARDIOLOGY | Facility: CLINIC | Age: 72
End: 2017-12-08

## 2017-12-08 ENCOUNTER — TELEPHONE (OUTPATIENT)
Dept: GASTROENTEROLOGY | Facility: CLINIC | Age: 72
End: 2017-12-08

## 2017-12-08 NOTE — TELEPHONE ENCOUNTER
Called patient and told him his stress test was normal per Dr. Richardson and cont. Risk modification and that he didn't need to see Dr. Richardson unless he has a problem

## 2017-12-08 NOTE — TELEPHONE ENCOUNTER
----- Message from Titi Richardson MD sent at 12/8/2017  6:37 AM EST -----  Since I am out of town.  Call and tell him his stress test is normal and continue with the risk modification.  Only needs to see me again if he has a problem

## 2017-12-08 NOTE — TELEPHONE ENCOUNTER
----- Message from Kenan Galindo MD sent at 12/5/2017  7:52 AM EST -----  Benign polyp, repeat colonoscopy 5 years  Patient called, advised of Dr. Galindo's note, he verb understanding. Patient health maintenance record updated to reflect the need to repeat colonoscopy in 5 years.

## 2018-01-17 ENCOUNTER — APPOINTMENT (OUTPATIENT)
Dept: GENERAL RADIOLOGY | Facility: HOSPITAL | Age: 73
End: 2018-01-17

## 2018-01-17 ENCOUNTER — HOSPITAL ENCOUNTER (OUTPATIENT)
Facility: HOSPITAL | Age: 73
LOS: 1 days | Discharge: HOME | End: 2018-01-19
Attending: EMERGENCY MEDICINE | Admitting: HOSPITALIST

## 2018-01-17 ENCOUNTER — TELEPHONE (OUTPATIENT)
Dept: ORTHOPEDIC SURGERY | Facility: CLINIC | Age: 73
End: 2018-01-17

## 2018-01-17 DIAGNOSIS — S76.111A RUPTURE OF RIGHT QUADRICEPS TENDON, INITIAL ENCOUNTER: Primary | ICD-10-CM

## 2018-01-17 LAB
ALBUMIN SERPL-MCNC: 3.9 G/DL (ref 3.5–5.2)
ALBUMIN/GLOB SERPL: 1.4 G/DL
ALP SERPL-CCNC: 42 U/L (ref 39–117)
ALT SERPL W P-5'-P-CCNC: 31 U/L (ref 1–41)
ANION GAP SERPL CALCULATED.3IONS-SCNC: 12.1 MMOL/L
APTT PPP: 28.7 SECONDS (ref 22.7–35.4)
AST SERPL-CCNC: 64 U/L (ref 1–40)
BASOPHILS # BLD AUTO: 0.02 10*3/MM3 (ref 0–0.2)
BASOPHILS NFR BLD AUTO: 0.2 % (ref 0–1.5)
BILIRUB SERPL-MCNC: 1 MG/DL (ref 0.1–1.2)
BUN BLD-MCNC: 15 MG/DL (ref 8–23)
BUN/CREAT SERPL: 20.8 (ref 7–25)
CALCIUM SPEC-SCNC: 9 MG/DL (ref 8.6–10.5)
CHLORIDE SERPL-SCNC: 97 MMOL/L (ref 98–107)
CO2 SERPL-SCNC: 29.9 MMOL/L (ref 22–29)
CREAT BLD-MCNC: 0.72 MG/DL (ref 0.76–1.27)
DEPRECATED RDW RBC AUTO: 44.4 FL (ref 37–54)
EOSINOPHIL # BLD AUTO: 0.14 10*3/MM3 (ref 0–0.7)
EOSINOPHIL NFR BLD AUTO: 1.6 % (ref 0.3–6.2)
ERYTHROCYTE [DISTWIDTH] IN BLOOD BY AUTOMATED COUNT: 13.2 % (ref 11.5–14.5)
GFR SERPL CREATININE-BSD FRML MDRD: 107 ML/MIN/1.73
GLOBULIN UR ELPH-MCNC: 2.8 GM/DL
GLUCOSE BLD-MCNC: 95 MG/DL (ref 65–99)
GLUCOSE BLDC GLUCOMTR-MCNC: 103 MG/DL (ref 70–130)
HCT VFR BLD AUTO: 38.6 % (ref 40.4–52.2)
HGB BLD-MCNC: 12.5 G/DL (ref 13.7–17.6)
IMM GRANULOCYTES # BLD: 0.02 10*3/MM3 (ref 0–0.03)
IMM GRANULOCYTES NFR BLD: 0.2 % (ref 0–0.5)
INR PPP: 1.07 (ref 0.9–1.1)
LYMPHOCYTES # BLD AUTO: 2.01 10*3/MM3 (ref 0.9–4.8)
LYMPHOCYTES NFR BLD AUTO: 23.2 % (ref 19.6–45.3)
MCH RBC QN AUTO: 30.3 PG (ref 27–32.7)
MCHC RBC AUTO-ENTMCNC: 32.4 G/DL (ref 32.6–36.4)
MCV RBC AUTO: 93.5 FL (ref 79.8–96.2)
MONOCYTES # BLD AUTO: 0.94 10*3/MM3 (ref 0.2–1.2)
MONOCYTES NFR BLD AUTO: 10.9 % (ref 5–12)
NEUTROPHILS # BLD AUTO: 5.53 10*3/MM3 (ref 1.9–8.1)
NEUTROPHILS NFR BLD AUTO: 63.9 % (ref 42.7–76)
PLATELET # BLD AUTO: 179 10*3/MM3 (ref 140–500)
PMV BLD AUTO: 9.2 FL (ref 6–12)
POTASSIUM BLD-SCNC: 3.7 MMOL/L (ref 3.5–5.2)
PROT SERPL-MCNC: 6.7 G/DL (ref 6–8.5)
PROTHROMBIN TIME: 13.5 SECONDS (ref 11.7–14.2)
RBC # BLD AUTO: 4.13 10*6/MM3 (ref 4.6–6)
SODIUM BLD-SCNC: 139 MMOL/L (ref 136–145)
WBC NRBC COR # BLD: 8.66 10*3/MM3 (ref 4.5–10.7)

## 2018-01-17 PROCEDURE — 82962 GLUCOSE BLOOD TEST: CPT

## 2018-01-17 PROCEDURE — 80053 COMPREHEN METABOLIC PANEL: CPT | Performed by: EMERGENCY MEDICINE

## 2018-01-17 PROCEDURE — 83036 HEMOGLOBIN GLYCOSYLATED A1C: CPT | Performed by: HOSPITALIST

## 2018-01-17 PROCEDURE — 73502 X-RAY EXAM HIP UNI 2-3 VIEWS: CPT

## 2018-01-17 PROCEDURE — 93005 ELECTROCARDIOGRAM TRACING: CPT | Performed by: EMERGENCY MEDICINE

## 2018-01-17 PROCEDURE — 73552 X-RAY EXAM OF FEMUR 2/>: CPT

## 2018-01-17 PROCEDURE — 99284 EMERGENCY DEPT VISIT MOD MDM: CPT

## 2018-01-17 PROCEDURE — 85610 PROTHROMBIN TIME: CPT | Performed by: EMERGENCY MEDICINE

## 2018-01-17 PROCEDURE — 71045 X-RAY EXAM CHEST 1 VIEW: CPT

## 2018-01-17 PROCEDURE — 93010 ELECTROCARDIOGRAM REPORT: CPT | Performed by: INTERNAL MEDICINE

## 2018-01-17 PROCEDURE — 96360 HYDRATION IV INFUSION INIT: CPT

## 2018-01-17 PROCEDURE — 85730 THROMBOPLASTIN TIME PARTIAL: CPT | Performed by: EMERGENCY MEDICINE

## 2018-01-17 PROCEDURE — 85025 COMPLETE CBC W/AUTO DIFF WBC: CPT | Performed by: EMERGENCY MEDICINE

## 2018-01-17 RX ORDER — LEVOTHYROXINE AND LIOTHYRONINE 19; 4.5 UG/1; UG/1
TABLET ORAL
COMMUNITY
Start: 2017-11-21 | End: 2018-06-07

## 2018-01-17 RX ORDER — SODIUM CHLORIDE 0.9 % (FLUSH) 0.9 %
10 SYRINGE (ML) INJECTION AS NEEDED
Status: DISCONTINUED | OUTPATIENT
Start: 2018-01-17 | End: 2018-01-19 | Stop reason: HOSPADM

## 2018-01-17 RX ORDER — CYCLOBENZAPRINE HCL 10 MG
TABLET ORAL
COMMUNITY
Start: 2017-10-20 | End: 2018-06-07

## 2018-01-17 RX ORDER — SODIUM CHLORIDE 9 MG/ML
125 INJECTION, SOLUTION INTRAVENOUS CONTINUOUS
Status: DISCONTINUED | OUTPATIENT
Start: 2018-01-17 | End: 2018-01-19

## 2018-01-17 RX ADMIN — SODIUM CHLORIDE 125 ML/HR: 9 INJECTION, SOLUTION INTRAVENOUS at 23:18

## 2018-01-17 NOTE — ED TRIAGE NOTES
Pt state she slipped and fell down the steps inside his home. Pt states he hit his head. Pt denies LOC. Pt c/o right upper leg pain.

## 2018-01-17 NOTE — TELEPHONE ENCOUNTER
Unfortunately, I cannot see him today.  I am also booked up on Friday.  Next week would be the earliest but FLORINA can probably see him that week as well.

## 2018-01-17 NOTE — ED PROVIDER NOTES
EMERGENCY DEPARTMENT ENCOUNTER    CHIEF COMPLAINT  Chief Complaint: R knee pain  History given by: pt  History limited by: nothing  Room Number: 50/50  PMD: Suyapa Fitzpatrick MD      HPI:  Pt is a 73 y.o. male who presents complaining of fall which occurred one day ago. The pt states that he was holding on to the stair railing when he slipped and fell. The pt states that he turned over as he fell and hit his R leg against the stair step. The pt c/o R knee pain. The pt states that he is unable to ambulate due to the R knee pain. The pt states that his R knee gives out with weight baring. The pt denies numbness, tingling, and abdominal pain.     Duration:  One day ago  Onset: gradual  Timing: constant  Location: R knee  Radiation: none  Quality: pain  Intensity/Severity: moderate  Progression: unchanged  Associated Symptoms: none  Aggravating Factors: none  Alleviating Factors: none  Previous Episodes: none  Treatment before arrival: none    PAST MEDICAL HISTORY  Active Ambulatory Problems     Diagnosis Date Noted   • Knee pain 03/04/2016   • Pain in both wrists 03/04/2016   • Hypertension 03/04/2016   • Hyperlipidemia 03/04/2016   • Pain of left lower extremity 03/04/2016   • Lumbar radiculopathy 03/04/2016   • Nail biting 03/04/2016   • Disuse syndrome 03/04/2016   • Pain in wrist 03/04/2016   • Non-insulin dependent type 2 diabetes mellitus 03/04/2016   • Lumbar spinal stenosis 03/04/2016   • Strain of left knee 07/07/2016   • Knee injury 10/20/2016   • History of fall 10/20/2016   • Muscle spasms of neck 11/23/2016   • Former smoker 11/23/2016   • ED (erectile dysfunction) 11/23/2016   • Medicare annual wellness visit, initial 03/31/2017   • Nocturia 03/31/2017   • Hx of colonic polyps 10/24/2017   • Dyspnea on exertion 11/20/2017   • Fatigue 11/20/2017     Resolved Ambulatory Problems     Diagnosis Date Noted   • No Resolved Ambulatory Problems     Past Medical History:   Diagnosis Date   • Chronic kidney  disease    • Diabetes mellitus    • Hyperlipidemia    • Hypertension    • Kidney stone    • Lumbar spinal stenosis    • Nocturia    • Non-insulin dependent type 2 diabetes mellitus    • Pain in both wrists    • Thyroid disease        PAST SURGICAL HISTORY  Past Surgical History:   Procedure Laterality Date   • COLONOSCOPY  03/18/2011   • COLONOSCOPY N/A 11/17/2017    Procedure: COLONOSCOPY TO CECUM/TI WITH POLYPECTOMY ( COLD BX);  Surgeon: Kenan Galindo MD;  Location: [facility] ENDOSCOPY;  Service:    • HERNIA REPAIR     • INGUINAL HERNIA REPAIR         FAMILY HISTORY  Family History   Problem Relation Age of Onset   • Cancer Mother    • Diabetes Mother    • Aortic aneurysm Father      Abdominal   • Coronary artery disease Father    • Heart attack Father    • Hypertension Father    • Sudden death Father    • Stroke Paternal Uncle        SOCIAL HISTORY  Social History     Social History   • Marital status:      Spouse name: N/A   • Number of children: N/A   • Years of education: N/A     Occupational History   • Not on file.     Social History Main Topics   • Smoking status: Former Smoker   • Smokeless tobacco: Never Used   • Alcohol use Yes      Comment: weekly - moderate   • Drug use: No   • Sexual activity: Defer     Other Topics Concern   • Not on file     Social History Narrative       ALLERGIES  Blue dyes (parenteral); Contrast dye; and Red dye    REVIEW OF SYSTEMS  Review of Systems   Constitutional: Negative for activity change, appetite change and fever.   HENT: Negative for congestion and sore throat.    Eyes: Negative.    Respiratory: Negative for cough and shortness of breath.    Cardiovascular: Negative for chest pain and leg swelling.   Gastrointestinal: Negative for abdominal pain, diarrhea and vomiting.   Endocrine: Negative.    Genitourinary: Negative for decreased urine volume and dysuria.   Musculoskeletal: Negative for neck pain.        R knee pain   Skin: Negative for rash and wound.    Allergic/Immunologic: Negative.    Neurological: Negative for weakness, numbness and headaches.   Hematological: Negative.    Psychiatric/Behavioral: Negative.    All other systems reviewed and are negative.      PHYSICAL EXAM  ED Triage Vitals   Temp Heart Rate Resp BP SpO2   01/17/18 1551 01/17/18 1533 01/17/18 1533 01/17/18 1551 01/17/18 1533   98.2 °F (36.8 °C) 102 16 142/94 97 %      Temp src Heart Rate Source Patient Position BP Location FiO2 (%)   01/17/18 1551 01/17/18 1533 -- -- --   Oral Monitor          Physical Exam   Constitutional: No distress.   HENT:   Head: Normocephalic and atraumatic.   Eyes: EOM are normal.   Neck: Normal range of motion.   Cardiovascular: Normal rate, regular rhythm and normal heart sounds.    Pulmonary/Chest: Effort normal and breath sounds normal. No respiratory distress. He has no wheezes. He has no rales.   Abdominal: Soft. Bowel sounds are normal. He exhibits no distension. There is no tenderness.   Musculoskeletal: He exhibits no edema.   tenderneess to distal thigh swelling and bruising. Absent quadricept tendon.   Neurological: He is alert.   Good pulses and good sensation distally   Skin: Skin is warm and dry.   Nursing note and vitals reviewed.      LAB RESULTS  Lab Results (last 24 hours)     ** No results found for the last 24 hours. **          I ordered the above labs and reviewed the results    RADIOLOGY  XR Hip With or Without Pelvis 2 - 3 View Right   Final Result       No acute fracture is identified. If there is further clinical concern,   MRI could be considered for further evaluation.               This report was finalized on 1/17/2018 4:50 PM by Dr. Greg Iglesias MD.          XR Femur 2 View Right   Final Result       No acute fracture is identified. If there is further clinical concern,   MRI could be considered for further evaluation.               This report was finalized on 1/17/2018 4:50 PM by Dr. Greg Iglesias MD.          XR Chest 2  View    (Results Pending)        I ordered the above noted radiological studies. Interpreted by radiologist.  Reviewed by me in PACS.       PROCEDURES  Procedures      PROGRESS AND CONSULTS  ED Course     1735: On initial exam, the pt has an absent quadricept tendon. The pt states that his orthopedist is Dr. Garza. Discussed plan to consult Dr. Garza (ortho).     1736: Placed call to Dr. Garza (ortho)    1822: Discussed pt's case with orthopedic nurse who states that Dr. Mclaughlin (ortho) is in surgery and will call back when he is out.     1855: Informed pt that Dr. Mclaughlin (ortho) is on call for Dr. Garza (ortho). Informed pt that I am waiting for Dr. Mclaughlin to get out of surgery.     1949: Discussed pt's case with Dr. Mclaughlin (ortho) in the ED. He agrees to consult. He asks that pt be admitted by medicine.     2001: Placed call to [facility].    2013: Discussed pt's case with Dr. Alston ([facility]) who agrees to admit. He would like labs, and EKG, and a chest XR ordered for further evaluation.     2017: Ordered XR chest, labs and EKG for further evaluation.     MEDICAL DECISION MAKING  Results were reviewed/discussed with the patient and they were also made aware of online access. Pt also made aware that some labs, such as cultures, will not be resulted during ER visit and follow up with PMD is necessary.     MDM  Number of Diagnoses or Management Options     Amount and/or Complexity of Data Reviewed  Clinical lab tests: ordered  Tests in the radiology section of CPT®: ordered and reviewed (XR hip: no acute fracture. XR R femur: no acute fracture)  Tests in the medicine section of CPT®: ordered  Decide to obtain previous medical records or to obtain history from someone other than the patient: yes  Review and summarize past medical records: yes  Discuss the patient with other providers: yes (Dr. Mclaughlin (ortho), Dr. Alston ([facility]))    Patient Progress  Patient progress: stable         DIAGNOSIS  Final diagnoses:   Rupture  of right quadriceps tendon, initial encounter       DISPOSITION  ADMISSION    Discussed treatment plan and reason for admission with pt/family and admitting physician.  Pt/family voiced understanding of the plan for admission for further testing/treatment as needed.         Latest Documented Vital Signs:  As of 8:17 PM  BP- 142/94 HR- 102 Temp- 98.2 °F (36.8 °C) (Oral) O2 sat- 97%    --  Documentation assistance provided by scribMalka Ventura for Dr. Alfonso.  Information recorded by the scribe was done at my direction and has been verified and validated by me.                Elsi Ventura  01/17/18 2019       Jean Alfonso MD  01/17/18 4135

## 2018-01-18 ENCOUNTER — ANESTHESIA (OUTPATIENT)
Dept: PERIOP | Facility: HOSPITAL | Age: 73
End: 2018-01-18

## 2018-01-18 ENCOUNTER — ANESTHESIA EVENT (OUTPATIENT)
Dept: PERIOP | Facility: HOSPITAL | Age: 73
End: 2018-01-18

## 2018-01-18 ENCOUNTER — APPOINTMENT (OUTPATIENT)
Dept: GENERAL RADIOLOGY | Facility: HOSPITAL | Age: 73
End: 2018-01-18

## 2018-01-18 LAB
ANION GAP SERPL CALCULATED.3IONS-SCNC: 8.4 MMOL/L
BASOPHILS # BLD AUTO: 0.02 10*3/MM3 (ref 0–0.2)
BASOPHILS NFR BLD AUTO: 0.3 % (ref 0–1.5)
BUN BLD-MCNC: 14 MG/DL (ref 8–23)
BUN/CREAT SERPL: 16.5 (ref 7–25)
CALCIUM SPEC-SCNC: 8.6 MG/DL (ref 8.6–10.5)
CHLORIDE SERPL-SCNC: 97 MMOL/L (ref 98–107)
CO2 SERPL-SCNC: 29.6 MMOL/L (ref 22–29)
CREAT BLD-MCNC: 0.85 MG/DL (ref 0.76–1.27)
DEPRECATED RDW RBC AUTO: 45.8 FL (ref 37–54)
EOSINOPHIL # BLD AUTO: 0.16 10*3/MM3 (ref 0–0.7)
EOSINOPHIL NFR BLD AUTO: 2.4 % (ref 0.3–6.2)
ERYTHROCYTE [DISTWIDTH] IN BLOOD BY AUTOMATED COUNT: 13.4 % (ref 11.5–14.5)
GFR SERPL CREATININE-BSD FRML MDRD: 88 ML/MIN/1.73
GLUCOSE BLD-MCNC: 144 MG/DL (ref 65–99)
GLUCOSE BLDC GLUCOMTR-MCNC: 119 MG/DL (ref 70–130)
GLUCOSE BLDC GLUCOMTR-MCNC: 123 MG/DL (ref 70–130)
GLUCOSE BLDC GLUCOMTR-MCNC: 154 MG/DL (ref 70–130)
GLUCOSE BLDC GLUCOMTR-MCNC: 170 MG/DL (ref 70–130)
HBA1C MFR BLD: 6.7 % (ref 4.8–5.6)
HCT VFR BLD AUTO: 36.5 % (ref 40.4–52.2)
HGB BLD-MCNC: 11.7 G/DL (ref 13.7–17.6)
IMM GRANULOCYTES # BLD: 0.02 10*3/MM3 (ref 0–0.03)
IMM GRANULOCYTES NFR BLD: 0.3 % (ref 0–0.5)
LYMPHOCYTES # BLD AUTO: 1.65 10*3/MM3 (ref 0.9–4.8)
LYMPHOCYTES NFR BLD AUTO: 24.6 % (ref 19.6–45.3)
MCH RBC QN AUTO: 30.1 PG (ref 27–32.7)
MCHC RBC AUTO-ENTMCNC: 32.1 G/DL (ref 32.6–36.4)
MCV RBC AUTO: 93.8 FL (ref 79.8–96.2)
MONOCYTES # BLD AUTO: 0.99 10*3/MM3 (ref 0.2–1.2)
MONOCYTES NFR BLD AUTO: 14.7 % (ref 5–12)
NEUTROPHILS # BLD AUTO: 3.88 10*3/MM3 (ref 1.9–8.1)
NEUTROPHILS NFR BLD AUTO: 57.7 % (ref 42.7–76)
PLATELET # BLD AUTO: 183 10*3/MM3 (ref 140–500)
PMV BLD AUTO: 9.8 FL (ref 6–12)
POTASSIUM BLD-SCNC: 4 MMOL/L (ref 3.5–5.2)
RBC # BLD AUTO: 3.89 10*6/MM3 (ref 4.6–6)
SODIUM BLD-SCNC: 135 MMOL/L (ref 136–145)
WBC NRBC COR # BLD: 6.72 10*3/MM3 (ref 4.5–10.7)

## 2018-01-18 PROCEDURE — 73560 X-RAY EXAM OF KNEE 1 OR 2: CPT

## 2018-01-18 PROCEDURE — 25010000003 CEFAZOLIN IN DEXTROSE 2-4 GM/100ML-% SOLUTION: Performed by: ORTHOPAEDIC SURGERY

## 2018-01-18 PROCEDURE — 25010000002 ROPIVACAINE PER 1 MG: Performed by: ANESTHESIOLOGY

## 2018-01-18 PROCEDURE — 25010000002 MIDAZOLAM PER 1 MG: Performed by: ANESTHESIOLOGY

## 2018-01-18 PROCEDURE — C1713 ANCHOR/SCREW BN/BN,TIS/BN: HCPCS | Performed by: ORTHOPAEDIC SURGERY

## 2018-01-18 PROCEDURE — L1830 KO IMMOB CANVAS LONG PRE OTS: HCPCS | Performed by: ORTHOPAEDIC SURGERY

## 2018-01-18 PROCEDURE — 63710000001 HYDROCODONE-ACETAMINOPHEN 5-325 MG TABLET: Performed by: HOSPITALIST

## 2018-01-18 PROCEDURE — 82962 GLUCOSE BLOOD TEST: CPT

## 2018-01-18 PROCEDURE — 63710000001 GLIPIZIDE 10 MG TABLET: Performed by: HOSPITALIST

## 2018-01-18 PROCEDURE — 85025 COMPLETE CBC W/AUTO DIFF WBC: CPT | Performed by: HOSPITALIST

## 2018-01-18 PROCEDURE — 25010000002 PROPOFOL 10 MG/ML EMULSION: Performed by: NURSE ANESTHETIST, CERTIFIED REGISTERED

## 2018-01-18 PROCEDURE — A9270 NON-COVERED ITEM OR SERVICE: HCPCS | Performed by: HOSPITALIST

## 2018-01-18 PROCEDURE — 25010000002 ONDANSETRON PER 1 MG: Performed by: NURSE ANESTHETIST, CERTIFIED REGISTERED

## 2018-01-18 PROCEDURE — 25010000002 FENTANYL CITRATE (PF) 100 MCG/2ML SOLUTION: Performed by: ANESTHESIOLOGY

## 2018-01-18 PROCEDURE — 63710000001 METFORMIN 1000 MG TABLET: Performed by: HOSPITALIST

## 2018-01-18 PROCEDURE — 25010000002 HYDROMORPHONE PER 4 MG: Performed by: HOSPITALIST

## 2018-01-18 PROCEDURE — 25010000002 FENTANYL CITRATE (PF) 100 MCG/2ML SOLUTION: Performed by: NURSE ANESTHETIST, CERTIFIED REGISTERED

## 2018-01-18 PROCEDURE — 80048 BASIC METABOLIC PNL TOTAL CA: CPT | Performed by: HOSPITALIST

## 2018-01-18 PROCEDURE — 96361 HYDRATE IV INFUSION ADD-ON: CPT

## 2018-01-18 RX ORDER — SODIUM CHLORIDE 0.9 % (FLUSH) 0.9 %
1-10 SYRINGE (ML) INJECTION AS NEEDED
Status: DISCONTINUED | OUTPATIENT
Start: 2018-01-18 | End: 2018-01-19 | Stop reason: HOSPADM

## 2018-01-18 RX ORDER — FENTANYL CITRATE 50 UG/ML
50 INJECTION, SOLUTION INTRAMUSCULAR; INTRAVENOUS
Status: DISCONTINUED | OUTPATIENT
Start: 2018-01-18 | End: 2018-01-18 | Stop reason: HOSPADM

## 2018-01-18 RX ORDER — NALOXONE HCL 0.4 MG/ML
0.4 VIAL (ML) INJECTION
Status: DISCONTINUED | OUTPATIENT
Start: 2018-01-18 | End: 2018-01-19 | Stop reason: HOSPADM

## 2018-01-18 RX ORDER — PROPOFOL 10 MG/ML
VIAL (ML) INTRAVENOUS AS NEEDED
Status: DISCONTINUED | OUTPATIENT
Start: 2018-01-18 | End: 2018-01-18 | Stop reason: SURG

## 2018-01-18 RX ORDER — NICOTINE POLACRILEX 4 MG
15 LOZENGE BUCCAL
Status: DISCONTINUED | OUTPATIENT
Start: 2018-01-18 | End: 2018-01-19 | Stop reason: HOSPADM

## 2018-01-18 RX ORDER — LIDOCAINE HYDROCHLORIDE 20 MG/ML
INJECTION, SOLUTION INFILTRATION; PERINEURAL AS NEEDED
Status: DISCONTINUED | OUTPATIENT
Start: 2018-01-18 | End: 2018-01-18 | Stop reason: SURG

## 2018-01-18 RX ORDER — EPHEDRINE SULFATE 50 MG/ML
5 INJECTION, SOLUTION INTRAVENOUS ONCE AS NEEDED
Status: DISCONTINUED | OUTPATIENT
Start: 2018-01-18 | End: 2018-01-18 | Stop reason: HOSPADM

## 2018-01-18 RX ORDER — HYDROCHLOROTHIAZIDE 12.5 MG/1
12.5 CAPSULE, GELATIN COATED ORAL
Status: DISCONTINUED | OUTPATIENT
Start: 2018-01-18 | End: 2018-01-19 | Stop reason: HOSPADM

## 2018-01-18 RX ORDER — HYDRALAZINE HYDROCHLORIDE 20 MG/ML
5 INJECTION INTRAMUSCULAR; INTRAVENOUS
Status: DISCONTINUED | OUTPATIENT
Start: 2018-01-18 | End: 2018-01-18 | Stop reason: HOSPADM

## 2018-01-18 RX ORDER — NALOXONE HCL 0.4 MG/ML
0.2 VIAL (ML) INJECTION AS NEEDED
Status: DISCONTINUED | OUTPATIENT
Start: 2018-01-18 | End: 2018-01-18 | Stop reason: HOSPADM

## 2018-01-18 RX ORDER — SODIUM CHLORIDE 0.9 % (FLUSH) 0.9 %
1-10 SYRINGE (ML) INJECTION AS NEEDED
Status: DISCONTINUED | OUTPATIENT
Start: 2018-01-18 | End: 2018-01-18 | Stop reason: HOSPADM

## 2018-01-18 RX ORDER — FLUMAZENIL 0.1 MG/ML
0.2 INJECTION INTRAVENOUS AS NEEDED
Status: DISCONTINUED | OUTPATIENT
Start: 2018-01-18 | End: 2018-01-18 | Stop reason: HOSPADM

## 2018-01-18 RX ORDER — ONDANSETRON 2 MG/ML
INJECTION INTRAMUSCULAR; INTRAVENOUS AS NEEDED
Status: DISCONTINUED | OUTPATIENT
Start: 2018-01-18 | End: 2018-01-18 | Stop reason: SURG

## 2018-01-18 RX ORDER — HYDROMORPHONE HCL 110MG/55ML
0.5 PATIENT CONTROLLED ANALGESIA SYRINGE INTRAVENOUS
Status: DISCONTINUED | OUTPATIENT
Start: 2018-01-18 | End: 2018-01-18 | Stop reason: HOSPADM

## 2018-01-18 RX ORDER — PROMETHAZINE HYDROCHLORIDE 25 MG/ML
12.5 INJECTION, SOLUTION INTRAMUSCULAR; INTRAVENOUS ONCE AS NEEDED
Status: DISCONTINUED | OUTPATIENT
Start: 2018-01-18 | End: 2018-01-18 | Stop reason: HOSPADM

## 2018-01-18 RX ORDER — ONDANSETRON 4 MG/1
4 TABLET, FILM COATED ORAL EVERY 6 HOURS PRN
Status: DISCONTINUED | OUTPATIENT
Start: 2018-01-18 | End: 2018-01-19 | Stop reason: HOSPADM

## 2018-01-18 RX ORDER — DIPHENHYDRAMINE HYDROCHLORIDE 50 MG/ML
12.5 INJECTION INTRAMUSCULAR; INTRAVENOUS
Status: DISCONTINUED | OUTPATIENT
Start: 2018-01-18 | End: 2018-01-18 | Stop reason: HOSPADM

## 2018-01-18 RX ORDER — PROMETHAZINE HYDROCHLORIDE 25 MG/ML
6.25 INJECTION, SOLUTION INTRAMUSCULAR; INTRAVENOUS ONCE AS NEEDED
Status: DISCONTINUED | OUTPATIENT
Start: 2018-01-18 | End: 2018-01-18 | Stop reason: HOSPADM

## 2018-01-18 RX ORDER — ONDANSETRON 4 MG/1
4 TABLET, ORALLY DISINTEGRATING ORAL EVERY 6 HOURS PRN
Status: DISCONTINUED | OUTPATIENT
Start: 2018-01-18 | End: 2018-01-19 | Stop reason: HOSPADM

## 2018-01-18 RX ORDER — ONDANSETRON 2 MG/ML
4 INJECTION INTRAMUSCULAR; INTRAVENOUS ONCE AS NEEDED
Status: DISCONTINUED | OUTPATIENT
Start: 2018-01-18 | End: 2018-01-18 | Stop reason: HOSPADM

## 2018-01-18 RX ORDER — HYDROCODONE BITARTRATE AND ACETAMINOPHEN 5; 325 MG/1; MG/1
1 TABLET ORAL EVERY 4 HOURS PRN
Status: DISCONTINUED | OUTPATIENT
Start: 2018-01-18 | End: 2018-01-19

## 2018-01-18 RX ORDER — FAMOTIDINE 10 MG/ML
20 INJECTION, SOLUTION INTRAVENOUS ONCE
Status: COMPLETED | OUTPATIENT
Start: 2018-01-18 | End: 2018-01-18

## 2018-01-18 RX ORDER — LEVOTHYROXINE AND LIOTHYRONINE 19; 4.5 UG/1; UG/1
30 TABLET ORAL
Status: DISCONTINUED | OUTPATIENT
Start: 2018-01-18 | End: 2018-01-19 | Stop reason: HOSPADM

## 2018-01-18 RX ORDER — GLIPIZIDE 10 MG/1
10 TABLET ORAL
Status: DISCONTINUED | OUTPATIENT
Start: 2018-01-18 | End: 2018-01-19 | Stop reason: HOSPADM

## 2018-01-18 RX ORDER — ROPIVACAINE HYDROCHLORIDE 5 MG/ML
INJECTION, SOLUTION EPIDURAL; INFILTRATION; PERINEURAL AS NEEDED
Status: DISCONTINUED | OUTPATIENT
Start: 2018-01-18 | End: 2018-01-18 | Stop reason: SURG

## 2018-01-18 RX ORDER — ONDANSETRON 2 MG/ML
4 INJECTION INTRAMUSCULAR; INTRAVENOUS EVERY 6 HOURS PRN
Status: DISCONTINUED | OUTPATIENT
Start: 2018-01-18 | End: 2018-01-19 | Stop reason: HOSPADM

## 2018-01-18 RX ORDER — DEXTROSE MONOHYDRATE 25 G/50ML
25 INJECTION, SOLUTION INTRAVENOUS
Status: DISCONTINUED | OUTPATIENT
Start: 2018-01-18 | End: 2018-01-19 | Stop reason: HOSPADM

## 2018-01-18 RX ORDER — VALSARTAN 160 MG/1
160 TABLET ORAL
Status: DISCONTINUED | OUTPATIENT
Start: 2018-01-18 | End: 2018-01-19 | Stop reason: HOSPADM

## 2018-01-18 RX ORDER — LIDOCAINE HYDROCHLORIDE 10 MG/ML
0.5 INJECTION, SOLUTION EPIDURAL; INFILTRATION; INTRACAUDAL; PERINEURAL ONCE AS NEEDED
Status: DISCONTINUED | OUTPATIENT
Start: 2018-01-18 | End: 2018-01-18 | Stop reason: HOSPADM

## 2018-01-18 RX ORDER — PROMETHAZINE HYDROCHLORIDE 25 MG/1
25 TABLET ORAL ONCE AS NEEDED
Status: DISCONTINUED | OUTPATIENT
Start: 2018-01-18 | End: 2018-01-18 | Stop reason: HOSPADM

## 2018-01-18 RX ORDER — PROMETHAZINE HYDROCHLORIDE 25 MG/1
25 SUPPOSITORY RECTAL ONCE AS NEEDED
Status: DISCONTINUED | OUTPATIENT
Start: 2018-01-18 | End: 2018-01-18 | Stop reason: HOSPADM

## 2018-01-18 RX ORDER — CEFAZOLIN SODIUM 2 G/100ML
2 INJECTION, SOLUTION INTRAVENOUS EVERY 8 HOURS
Status: COMPLETED | OUTPATIENT
Start: 2018-01-18 | End: 2018-01-19

## 2018-01-18 RX ORDER — MAGNESIUM HYDROXIDE 1200 MG/15ML
LIQUID ORAL AS NEEDED
Status: DISCONTINUED | OUTPATIENT
Start: 2018-01-18 | End: 2018-01-18 | Stop reason: HOSPADM

## 2018-01-18 RX ORDER — ACETAMINOPHEN 325 MG/1
650 TABLET ORAL EVERY 4 HOURS PRN
Status: DISCONTINUED | OUTPATIENT
Start: 2018-01-18 | End: 2018-01-19 | Stop reason: HOSPADM

## 2018-01-18 RX ORDER — LANOLIN ALCOHOL/MO/W.PET/CERES
100 CREAM (GRAM) TOPICAL DAILY
Status: DISCONTINUED | OUTPATIENT
Start: 2018-01-18 | End: 2018-01-19 | Stop reason: HOSPADM

## 2018-01-18 RX ORDER — EPHEDRINE SULFATE 50 MG/ML
INJECTION, SOLUTION INTRAVENOUS AS NEEDED
Status: DISCONTINUED | OUTPATIENT
Start: 2018-01-18 | End: 2018-01-18 | Stop reason: SURG

## 2018-01-18 RX ORDER — FENTANYL CITRATE 50 UG/ML
INJECTION, SOLUTION INTRAMUSCULAR; INTRAVENOUS AS NEEDED
Status: DISCONTINUED | OUTPATIENT
Start: 2018-01-18 | End: 2018-01-18 | Stop reason: SURG

## 2018-01-18 RX ORDER — ROCURONIUM BROMIDE 10 MG/ML
INJECTION, SOLUTION INTRAVENOUS AS NEEDED
Status: DISCONTINUED | OUTPATIENT
Start: 2018-01-18 | End: 2018-01-18 | Stop reason: SURG

## 2018-01-18 RX ORDER — MIDAZOLAM HYDROCHLORIDE 1 MG/ML
1 INJECTION INTRAMUSCULAR; INTRAVENOUS
Status: DISCONTINUED | OUTPATIENT
Start: 2018-01-18 | End: 2018-01-18 | Stop reason: HOSPADM

## 2018-01-18 RX ORDER — SODIUM CHLORIDE, SODIUM LACTATE, POTASSIUM CHLORIDE, CALCIUM CHLORIDE 600; 310; 30; 20 MG/100ML; MG/100ML; MG/100ML; MG/100ML
9 INJECTION, SOLUTION INTRAVENOUS CONTINUOUS
Status: DISCONTINUED | OUTPATIENT
Start: 2018-01-18 | End: 2018-01-18

## 2018-01-18 RX ORDER — HYDROMORPHONE HYDROCHLORIDE 1 MG/ML
0.5 INJECTION, SOLUTION INTRAMUSCULAR; INTRAVENOUS; SUBCUTANEOUS
Status: DISCONTINUED | OUTPATIENT
Start: 2018-01-18 | End: 2018-01-19 | Stop reason: HOSPADM

## 2018-01-18 RX ORDER — MIDAZOLAM HYDROCHLORIDE 1 MG/ML
2 INJECTION INTRAMUSCULAR; INTRAVENOUS
Status: DISCONTINUED | OUTPATIENT
Start: 2018-01-18 | End: 2018-01-18 | Stop reason: HOSPADM

## 2018-01-18 RX ORDER — ATORVASTATIN CALCIUM 20 MG/1
40 TABLET, FILM COATED ORAL DAILY
Status: DISCONTINUED | OUTPATIENT
Start: 2018-01-18 | End: 2018-01-19 | Stop reason: HOSPADM

## 2018-01-18 RX ORDER — VALSARTAN AND HYDROCHLOROTHIAZIDE 160; 12.5 MG/1; MG/1
1 TABLET, FILM COATED ORAL DAILY
Status: DISCONTINUED | OUTPATIENT
Start: 2018-01-18 | End: 2018-01-18 | Stop reason: CLARIF

## 2018-01-18 RX ORDER — LABETALOL HYDROCHLORIDE 5 MG/ML
5 INJECTION, SOLUTION INTRAVENOUS
Status: DISCONTINUED | OUTPATIENT
Start: 2018-01-18 | End: 2018-01-18 | Stop reason: HOSPADM

## 2018-01-18 RX ADMIN — FAMOTIDINE 20 MG: 10 INJECTION, SOLUTION INTRAVENOUS at 11:32

## 2018-01-18 RX ADMIN — Medication 2 MG: at 11:36

## 2018-01-18 RX ADMIN — EPHEDRINE SULFATE 10 MG: 50 INJECTION INTRAMUSCULAR; INTRAVENOUS; SUBCUTANEOUS at 12:45

## 2018-01-18 RX ADMIN — SODIUM CHLORIDE, POTASSIUM CHLORIDE, SODIUM LACTATE AND CALCIUM CHLORIDE 9 ML/HR: 600; 310; 30; 20 INJECTION, SOLUTION INTRAVENOUS at 11:32

## 2018-01-18 RX ADMIN — FENTANYL CITRATE 50 MCG: 50 INJECTION INTRAMUSCULAR; INTRAVENOUS at 13:50

## 2018-01-18 RX ADMIN — GLIPIZIDE 10 MG: 10 TABLET ORAL at 16:59

## 2018-01-18 RX ADMIN — SUGAMMADEX 200 MG: 100 INJECTION, SOLUTION INTRAVENOUS at 13:26

## 2018-01-18 RX ADMIN — FENTANYL CITRATE 50 MCG: 50 INJECTION INTRAMUSCULAR; INTRAVENOUS at 11:39

## 2018-01-18 RX ADMIN — CEFAZOLIN SODIUM 2 G: 1 INJECTION, POWDER, FOR SOLUTION INTRAMUSCULAR; INTRAVENOUS at 12:46

## 2018-01-18 RX ADMIN — HYDROCODONE BITARTRATE AND ACETAMINOPHEN 1 TABLET: 5; 325 TABLET ORAL at 21:04

## 2018-01-18 RX ADMIN — FENTANYL CITRATE 50 MCG: 50 INJECTION INTRAMUSCULAR; INTRAVENOUS at 11:35

## 2018-01-18 RX ADMIN — ROPIVACAINE HYDROCHLORIDE 30 ML: 5 INJECTION, SOLUTION EPIDURAL; INFILTRATION; PERINEURAL at 11:40

## 2018-01-18 RX ADMIN — ONDANSETRON 4 MG: 2 INJECTION INTRAMUSCULAR; INTRAVENOUS at 13:25

## 2018-01-18 RX ADMIN — HYDROMORPHONE HYDROCHLORIDE 0.5 MG: 1 INJECTION, SOLUTION INTRAMUSCULAR; INTRAVENOUS; SUBCUTANEOUS at 23:58

## 2018-01-18 RX ADMIN — ROCURONIUM BROMIDE 30 MG: 10 INJECTION INTRAVENOUS at 12:39

## 2018-01-18 RX ADMIN — METFORMIN HYDROCHLORIDE 1000 MG: 1000 TABLET ORAL at 16:59

## 2018-01-18 RX ADMIN — CEFAZOLIN SODIUM 2 G: 2 INJECTION, SOLUTION INTRAVENOUS at 20:54

## 2018-01-18 RX ADMIN — SODIUM CHLORIDE 125 ML/HR: 9 INJECTION, SOLUTION INTRAVENOUS at 07:30

## 2018-01-18 RX ADMIN — HYDROCODONE BITARTRATE AND ACETAMINOPHEN 1 TABLET: 5; 325 TABLET ORAL at 17:04

## 2018-01-18 RX ADMIN — LIDOCAINE HYDROCHLORIDE 80 MG: 20 INJECTION, SOLUTION INFILTRATION; PERINEURAL at 12:39

## 2018-01-18 RX ADMIN — FENTANYL CITRATE 100 MCG: 50 INJECTION INTRAMUSCULAR; INTRAVENOUS at 12:39

## 2018-01-18 RX ADMIN — PROPOFOL 180 MG: 10 INJECTION, EMULSION INTRAVENOUS at 12:39

## 2018-01-18 NOTE — CONSULTS
Orthopedic Consult      Patient: Jenaro Haji Jr.  YOB: 1945     Date of Admission: 1/17/2018  4:51 PM    Medical Record Number: [id number]    Attending Physician: Brett Alston MD    Consulting Physician: Brett Mclaughlin MD    Reason for Consult: Right quadriceps tendon avulsion    History of Present Illness: 73 y.o. male admitted to [facility], [place], operated by [facility] with Rupture of right quadriceps tendon, initial encounter [S76.111A].      The patient's a 73-year-old white male with history of chronic kidney disease, diabetes, hypertension, hyperlipidemia, kidney stones, lumbar spinal stenosis, hypothyroidism and non-insulin-dependent diabetes who is admitted with history of having fallen down some steps at home.  He injured his right leg and was quite quite a bit of pain.  He was evaluated in the ER and found had right quadriceps tendon rupture.  The patient was admitted for further evaluation treatment of this.  Otherwise is been in his usual state of health and hadn't had any other complaints.  He denies having any chest pain or shortness of air.  As I Dr Mclaughlin was on call for the emergency room and was consulted for further evaluation and treatment. Has seen and evaluated the patient in the emergency room.  He is accompanied by his wife to this hospital visit.  She was at the bedside.  He denies any history of loss of consciousness, headache, vomiting several seizures.     Patient denies any history of: DVT/PE, MRSA, COPD,  CHF, CAD, or Atrial fibrillation.   Patient has a history of : Chronic kidney disease, diabetes mellitus, thyroid disease.  Patient is not taking anticoagulants.     Past medical history, Past surgical history, family history, Social history, current medications, home medications Have been reviewed by me.    Past Medical History:   Diagnosis Date   • Chronic kidney disease    • Diabetes mellitus    • Hyperlipidemia    • Hypertension    • Kidney stone    • Lumbar spinal  stenosis    • Nocturia    • Non-insulin dependent type 2 diabetes mellitus    • Pain in both wrists    • Thyroid disease      Past Surgical History:   Procedure Laterality Date   • COLONOSCOPY  03/18/2011   • COLONOSCOPY N/A 11/17/2017    Procedure: COLONOSCOPY TO CECUM/TI WITH POLYPECTOMY ( COLD BX);  Surgeon: Kenan Galindo MD;  Location: [facility] ENDOSCOPY;  Service:    • HERNIA REPAIR     • INGUINAL HERNIA REPAIR       Social History     Occupational History   • Not on file.     Social History Main Topics   • Smoking status: Former Smoker   • Smokeless tobacco: Never Used   • Alcohol use Yes      Comment: weekly - moderate   • Drug use: No   • Sexual activity: Defer    Social History     Social History Narrative     Family History   Problem Relation Age of Onset   • Cancer Mother    • Diabetes Mother    • Aortic aneurysm Father      Abdominal   • Coronary artery disease Father    • Heart attack Father    • Hypertension Father    • Sudden death Father    • Stroke Paternal Uncle           Allergies   Allergen Reactions   • Contrast Dye         Home Medications:  Prescriptions Prior to Admission   Medication Sig Dispense Refill Last Dose   • aspirin 81 MG EC tablet Take 81 mg by mouth Daily.   1/17/2018 at Unknown time   • atorvastatin (LIPITOR) 40 MG tablet Take 40 mg by mouth Daily.   1/16/2018 at Unknown time   • glipiZIDE (GLUCOTROL) 10 MG tablet TAKE 1 TABLET TWICE DAILY 180 tablet 2 1/17/2018 at Unknown time   • metFORMIN (GLUCOPHAGE) 1000 MG tablet TAKE 1 TABLET TWICE DAILY WITH MEALS 180 tablet 2 1/17/2018 at Unknown time   • Multiple Vitamins-Minerals (CENTRUM SILVER 50+MEN) tablet Take  by mouth.   1/17/2018 at Unknown time   • Thyroid (NP THYROID) 30 MG PO tablet    1/17/2018 at Unknown time   • valsartan-hydrochlorothiazide (DIOVAN-HCT) 320-25 MG per tablet Take 1 tablet by mouth Daily.   1/16/2018 at Unknown time   • vitamin B-6 (PYRIDOXINE) 50 MG tablet Take 100 mg by mouth Daily.   1/16/2018 at  "Unknown time   • cyclobenzaprine (FLEXERIL) 10 MG tablet       • RELION CONFIRM/MICRO TEST test strip CHECK BLOOD SUGAR ONCE DAILY 100 each 3 Taking       Current Medications:  Scheduled Meds:   Continuous Infusions:  sodium chloride 125 mL/hr     PRN Meds:.•  Insert peripheral IV **AND** sodium chloride    Review of Systems:   A 12 point system review was reviewed with the patient and from the chart  and is negative except as mentioned in history of present illness.      Physical Exam: 73 y.o. male                    Vitals:    01/17/18 1533 01/17/18 1551 01/17/18 2134   BP:  142/94 134/79   BP Location:   Right arm   Pulse: 102  84   Resp: 16     Temp:  98.2 °F (36.8 °C) 97.3 °F (36.3 °C)   TempSrc:  Oral Oral   SpO2: 97%  97%   Weight: 81.6 kg (180 lb)     Height: 170.2 cm (67\")          Gait: Not evaluated.     Mental/HEENT/cardio/skin: The patient's general appearance was well-nourished, well-hydrated, no acute distress.  Orientation was alert and oriented ×3.  The patient's mood was normal.   Pulmonary exam shows normal late exchange, no labored breathing, or shortness of breath.    The  skin exam showed normal temperature and color in the area of examination.    Extremities: Positive hemarthrosis, positive swelling of the RIGHT knee, positive palpable defect in the suprapatellar area, inability to do a straight leg raise, positive tenderness in the suprapatellar area.  No tenderness around the proximal tibia or distal femur.  He is able to do good active ankle range of motion.  Good toe movement.    Pulses:  Pulses palpable and equal bilaterally    Diagnostic Tests:      Results from last 7 days  Lab Units 01/17/18 2025   WBC 10*3/mm3 8.66   HEMOGLOBIN g/dL 12.5*   HEMATOCRIT % 38.6*   PLATELETS 10*3/mm3 179       Results from last 7 days  Lab Units 01/17/18 2025   SODIUM mmol/L 139   POTASSIUM mmol/L 3.7   CHLORIDE mmol/L 97*   CO2 mmol/L 29.9*   BUN mg/dL 15   CREATININE mg/dL 0.72*   GLUCOSE mg/dL 95 "   CALCIUM mg/dL 9.0       Results from last 7 days  Lab Units 01/17/18 2025   INR  1.07   APTT seconds 28.7     No results found for: CRYSTAL]  No results found for: CULTURE]  No results found for: URICACID]  )Xr Femur 2 View Right    Result Date: 1/17/2018  Narrative: XR HIP W OR WO PELVIS 2-3 VIEW RIGHT-, XR FEMUR 2 VW RIGHT-  INDICATIONS:     Trauma  TECHNIQUE: FRONTAL VIEW OF THE PELVIS, 2 VIEWS OF THE RIGHT HIP, FRONTAL AND LATERAL VIEWS OF THE RIGHT FEMUR  COMPARISON: None available  FINDINGS:   No acute fracture is identified. No dislocation. Mild right hip joint space narrowing is seen. Degenerative changes are seen in the visualized lower lumbar spine. Arterial calcifications are conspicuous.      Impression:  No acute fracture is identified. If there is further clinical concern, MRI could be considered for further evaluation.    This report was finalized on 1/17/2018 4:50 PM by Dr. Greg Iglesias MD.      Xr Chest 1 View    Result Date: 1/17/2018  Narrative: PORTABLE CHEST 1/17/2018 AT 8:42 PM  CLINICAL HISTORY: Preop chest x-ray. Hypertension.  The lungs are well-expanded and appear free of acute infiltrates. There are no pleural effusions. The heart is normal in size. The thoracic aorta is mildly tortuous.  IMPRESSIONS: No evidence of active disease within the chest.  This report was finalized on 1/17/2018 8:53 PM by Dr. Nehemias Chua MD.      Xr Hip With Or Without Pelvis 2 - 3 View Right    Result Date: 1/17/2018  Narrative: XR HIP W OR WO PELVIS 2-3 VIEW RIGHT-, XR FEMUR 2 VW RIGHT-  INDICATIONS:     Trauma  TECHNIQUE: FRONTAL VIEW OF THE PELVIS, 2 VIEWS OF THE RIGHT HIP, FRONTAL AND LATERAL VIEWS OF THE RIGHT FEMUR  COMPARISON: None available  FINDINGS:   No acute fracture is identified. No dislocation. Mild right hip joint space narrowing is seen. Degenerative changes are seen in the visualized lower lumbar spine. Arterial calcifications are conspicuous.      Impression:  No acute fracture is  identified. If there is further clinical concern, MRI could be considered for further evaluation.    This report was finalized on 1/17/2018 4:50 PM by Dr. Greg Iglesias MD.        Assessment: Right quadriceps tendon avulsion tear    Patient Active Problem List   Diagnosis   • Knee pain   • Pain in both wrists   • Hypertension   • Hyperlipidemia   • Pain of left lower extremity   • Lumbar radiculopathy   • Nail biting   • Disuse syndrome   • Pain in wrist   • Non-insulin dependent type 2 diabetes mellitus   • Lumbar spinal stenosis   • Strain of left knee   • Knee injury   • History of fall   • Muscle spasms of neck   • Former smoker   • ED (erectile dysfunction)   • Medicare annual wellness visit, initial   • Nocturia   • Hx of colonic polyps   • Dyspnea on exertion   • Fatigue   • Rupture of right quadriceps tendon       Plan:  Options and alternatives were discussed in detail with the patient and   family. The patient is indicated for open repair right quadriceps tendon tear.     The patient voiced understanding of the risks, benefits, and alternative forms of treatment that were discussed including but not limited to  Infection, DVT, pulmonary embolism, fat embolism, stiffness, arthritis, rerupture, medical risks including stroke, heart attack have been discussed. Despite the risks involved the patient and  family consent to proceed with open repair.     The patient is scheduled for the above surgery tentatively for Jan 18, 2018 .  The patient's admitting service has seen the patient and the patient is   cleared to the operating room.     Date: 1/17/2018    Brett Mclaughlin MD     CC: Suyapa Fitzpatrick MD; MD Brett Cevallos MD

## 2018-01-18 NOTE — PLAN OF CARE
Problem: Patient Care Overview (Adult)  Goal: Plan of Care Review  Outcome: Ongoing (interventions implemented as appropriate)   01/18/18 1149   Coping/Psychosocial Response Interventions   Plan Of Care Reviewed With patient   Patient Care Overview   Progress no change   Outcome Evaluation   Outcome Summary/Follow up Plan preparing for surgery

## 2018-01-18 NOTE — ANESTHESIA PROCEDURE NOTES
Peripheral Block    Start time: 1/18/2018 11:30 AM  Stop time: 1/18/2018 11:40 AM  Performed by  Anesthesiologist: PAULA BISHOP  Preanesthetic Checklist  Completed: patient identified, site marked, surgical consent, pre-op evaluation, timeout performed, IV checked, risks and benefits discussed and monitors and equipment checked  Prep:  Pt Position: supine  Sterile barriers:gloves  Prep: ChloraPrep  Patient monitoring: blood pressure monitoring, continuous pulse oximetry and EKG  Procedure  Sedation:yes  Performed under: PNB  Guidance:nerve stimulator and landmark technique  Images:still images not obtained  Loss of twitch: 50 mA  Laterality:right  Block Type:femoral  Injection Technique:single-shot  Needle Type:short-bevel  Resistance on Injection: none  Medications  Local Injected:ropivacaine 0.5% Local Amount Injected:30mL  Post Assessment  Injection Assessment: negative aspiration for heme, no paresthesia on injection and incremental injection  Patient Tolerance:comfortable throughout block  Complications:no

## 2018-01-18 NOTE — H&P
HISTORY AND PHYSICAL   [facility]        Patient Identification:  Name: Jenaro Haji Jr.  Age: 73 y.o.  Sex: male  :  1945  MRN: [id number]                     Primary Care Physician: Suyapa Fitzpatrick MD    Chief Complaint:  Right leg pain    History of Present Illness:       The patient's a 73-year-old white male with history of chronic kidney disease, diabetes, hypertension, hyperlipidemia, kidney stones, lumbar spinal stenosis, hypothyroidism and non-insulin-dependent diabetes who is admitted with history of having fallen down some steps at home.  He injured his right leg and was quite quite a bit of pain.  He was evaluated in the ER and found had right quadriceps tendon rupture.  The patient was admitted for further evaluation treatment of this.  Otherwise is been in his usual state of health and hadn't had any other complaints.  He denies having any chest pain or shortness of air.  He says he's had a stress test recently that was negative for ischemia.    Past Medical History:  Past Medical History:   Diagnosis Date   • Chronic kidney disease    • Diabetes mellitus    • Hyperlipidemia    • Hypertension    • Kidney stone    • Lumbar spinal stenosis    • Nocturia    • Non-insulin dependent type 2 diabetes mellitus    • Pain in both wrists    • Thyroid disease      Past Surgical History:  Past Surgical History:   Procedure Laterality Date   • COLONOSCOPY  2011   • COLONOSCOPY N/A 2017    Procedure: COLONOSCOPY TO CECUM/TI WITH POLYPECTOMY ( COLD BX);  Surgeon: Kenan Galindo MD;  Location: [facility] ENDOSCOPY;  Service:    • HERNIA REPAIR     • INGUINAL HERNIA REPAIR        Home Meds:  Prescriptions Prior to Admission   Medication Sig Dispense Refill Last Dose   • aspirin 81 MG EC tablet Take 81 mg by mouth Daily.   2018 at Unknown time   • atorvastatin (LIPITOR) 40 MG tablet Take 40 mg by mouth Daily.   2018 at Unknown time   • glipiZIDE (GLUCOTROL) 10 MG tablet  TAKE 1 TABLET TWICE DAILY 180 tablet 2 1/17/2018 at Unknown time   • metFORMIN (GLUCOPHAGE) 1000 MG tablet TAKE 1 TABLET TWICE DAILY WITH MEALS 180 tablet 2 1/17/2018 at Unknown time   • Multiple Vitamins-Minerals (CENTRUM SILVER 50+MEN) tablet Take  by mouth.   1/17/2018 at Unknown time   • Thyroid (NP THYROID) 30 MG PO tablet    1/17/2018 at Unknown time   • valsartan-hydrochlorothiazide (DIOVAN-HCT) 320-25 MG per tablet Take 1 tablet by mouth Daily.   1/16/2018 at Unknown time   • vitamin B-6 (PYRIDOXINE) 50 MG tablet Take 100 mg by mouth Daily.   1/16/2018 at Unknown time   • cyclobenzaprine (FLEXERIL) 10 MG tablet       • RELION CONFIRM/MICRO TEST test strip CHECK BLOOD SUGAR ONCE DAILY 100 each 3 Taking       Allergies:  Allergies   Allergen Reactions   • Contrast Dye      Immunizations:  Immunization History   Administered Date(s) Administered   • Influenza LAIV (Nasal) 10/12/2015   • Influenza, Quadrivalent 12/08/2014   • Pneumococcal Conjugate 13-Valent 10/12/2015   • Pneumococcal Polysaccharide 03/31/2017     Social History:   Social History     Social History Narrative     Social History     Social History   • Marital status:      Spouse name: N/A   • Number of children: N/A   • Years of education: N/A     Occupational History   • Not on file.     Social History Main Topics   • Smoking status: Former Smoker   • Smokeless tobacco: Never Used   • Alcohol use Yes      Comment: weekly - moderate   • Drug use: No   • Sexual activity: Defer     Other Topics Concern   • Not on file     Social History Narrative       Family History:  Family History   Problem Relation Age of Onset   • Cancer Mother    • Diabetes Mother    • Aortic aneurysm Father      Abdominal   • Coronary artery disease Father    • Heart attack Father    • Hypertension Father    • Sudden death Father    • Stroke Paternal Uncle         Review of Systems  See history of present illness and past medical history.  Patient denies headache,  "dizziness, syncope, falls, trauma, change in vision, change in hearing, change in taste, changes in weight, changes in appetite, focal weakness, numbness, or paresthesia.  Patient denies chest pain, palpitations, dyspnea, orthopnea, PND, cough, sinus pressure, rhinorrhea, epistaxis, hemoptysis, nausea, vomiting,hematemesis, diarrhea, constipation or hematchezia.  Denies cold or heat intolerance, polydipsia, polyuria, polyphagia. Denies hematuria, pyuria, dysuria, hesitancy, frequency or urgency. Denies consumption of raw and under cooked meats foods or change in water source.  Denies fever, chills, sweats, night sweats.  Denies missing any routine medications. Remainder of ROS is negative.    Objective:  tMax 24 hrs: Temp (24hrs), Av.8 °F (36.6 °C), Min:97.3 °F (36.3 °C), Max:98.2 °F (36.8 °C)    Vitals Ranges:   Temp:  [97.3 °F (36.3 °C)-98.2 °F (36.8 °C)] 97.3 °F (36.3 °C)  Heart Rate:  [] 84  Resp:  [16] 16  BP: (134-142)/(79-94) 134/79      Exam:  /79 (BP Location: Right arm)  Pulse 84  Temp 97.3 °F (36.3 °C) (Oral)   Resp 16  Ht 170.2 cm (67\")  Wt 81.6 kg (180 lb)  SpO2 97%  BMI 28.19 kg/m2    General Appearance:    Alert, cooperative, no distress, appears stated age   Head:    Normocephalic, without obvious abnormality, atraumatic   Eyes:    PERRL, conjunctiva/corneas clear, EOM's intact, both eyes   Ears:    Normal external ear canals, both ears   Nose:   Nares normal, septum midline, mucosa normal, no drainage    or sinus tenderness   Throat:   Lips, mucosa, and tongue normal   Neck:   Supple, symmetrical, trachea midline, no adenopathy;     thyroid:  no enlargement/tenderness/nodules; no carotid    bruit or JVD   Back:     Symmetric, no curvature, ROM normal, no CVA tenderness   Lungs:     Clear to auscultation bilaterally, respirations unlabored   Chest Wall:    No tenderness or deformity    Heart:    Regular rate and rhythm, S1 and S2 normal, no murmur, rub   or gallop   Abdomen:    "  Soft, non-tender, bowel sounds active all four quadrants,     no masses, no hepatomegaly, no splenomegaly   Extremities:   Extremities normal, Right leg in the moles blue eyes are due to quadriceps tendon rupture on right leg, no cyanosis or edema   Pulses:   2+ and symmetric all extremities   Skin:   Skin color, texture, turgor normal, no rashes or lesions   Lymph nodes:   Cervical, supraclavicular, and axillary nodes normal   Neurologic:   CNII-XII intact, normal strength, sensation intact throughout      .    Data Review:  Lab Results (last 72 hours)     Procedure Component Value Units Date/Time    CBC & Differential [[id number]] Collected:  01/17/18 2025    Specimen:  Blood Updated:  01/17/18 2036    Narrative:       The following orders were created for panel order CBC & Differential.  Procedure                               Abnormality         Status                     ---------                               -----------         ------                     CBC Auto Differential[[id number]]        Abnormal            Final result                 Please view results for these tests on the individual orders.    CBC Auto Differential [[id number]]  (Abnormal) Collected:  01/17/18 2025    Specimen:  Blood Updated:  01/17/18 2036     WBC 8.66 10*3/mm3      RBC 4.13 (L) 10*6/mm3      Hemoglobin 12.5 (L) g/dL      Hematocrit 38.6 (L) %      MCV 93.5 fL      MCH 30.3 pg      MCHC 32.4 (L) g/dL      RDW 13.2 %      RDW-SD 44.4 fl      MPV 9.2 fL      Platelets 179 10*3/mm3      Neutrophil % 63.9 %      Lymphocyte % 23.2 %      Monocyte % 10.9 %      Eosinophil % 1.6 %      Basophil % 0.2 %      Immature Grans % 0.2 %      Neutrophils, Absolute 5.53 10*3/mm3      Lymphocytes, Absolute 2.01 10*3/mm3      Monocytes, Absolute 0.94 10*3/mm3      Eosinophils, Absolute 0.14 10*3/mm3      Basophils, Absolute 0.02 10*3/mm3      Immature Grans, Absolute 0.02 10*3/mm3     Protime-INR [[id number]]  (Normal) Collected:  01/17/18 2025     Specimen:  Blood Updated:  01/17/18 2057     Protime 13.5 Seconds      INR 1.07    aPTT [[id number]]  (Normal) Collected:  01/17/18 2025    Specimen:  Blood Updated:  01/17/18 2057     PTT 28.7 seconds     Comprehensive Metabolic Panel [[id number]]  (Abnormal) Collected:  01/17/18 2025    Specimen:  Blood Updated:  01/17/18 2102     Glucose 95 mg/dL      BUN 15 mg/dL      Creatinine 0.72 (L) mg/dL      Sodium 139 mmol/L      Potassium 3.7 mmol/L      Chloride 97 (L) mmol/L      CO2 29.9 (H) mmol/L      Calcium 9.0 mg/dL      Total Protein 6.7 g/dL      Albumin 3.90 g/dL      ALT (SGPT) 31 U/L      AST (SGOT) 64 (H) U/L      Alkaline Phosphatase 42 U/L      Total Bilirubin 1.0 mg/dL      eGFR Non African Amer 107 mL/min/1.73      Globulin 2.8 gm/dL      A/G Ratio 1.4 g/dL      BUN/Creatinine Ratio 20.8     Anion Gap 12.1 mmol/L     Narrative:       The MDRD GFR formula is only valid for adults with stable renal function between ages 18 and 70.    POC Glucose Once [[id number]]  (Normal) Collected:  01/17/18 2146    Specimen:  Blood Updated:  01/17/18 2147     Glucose 103 mg/dL     Narrative:       Meter: [id number] : [id number] Sony Coulter CNA                   Imaging Results (all)     Procedure Component Value Units Date/Time    XR Hip With or Without Pelvis 2 - 3 View Right [[id number]] Collected:  01/17/18 1648     Updated:  01/17/18 1653    Narrative:       XR HIP W OR WO PELVIS 2-3 VIEW RIGHT-, XR FEMUR 2 VW RIGHT-     INDICATIONS:     Trauma     TECHNIQUE: FRONTAL VIEW OF THE PELVIS, 2 VIEWS OF THE RIGHT HIP, FRONTAL  AND LATERAL VIEWS OF THE RIGHT FEMUR     COMPARISON: None available     FINDINGS:      No acute fracture is identified. No dislocation. Mild right hip joint  space narrowing is seen. Degenerative changes are seen in the visualized  lower lumbar spine. Arterial calcifications are conspicuous.       Impression:          No acute fracture is identified. If there is further clinical concern,  MRI could  be considered for further evaluation.           This report was finalized on 1/17/2018 4:50 PM by Dr. Greg Iglesias MD.       XR Femur 2 View Right [[id number]] Collected:  01/17/18 1648     Updated:  01/17/18 1653    Narrative:       XR HIP W OR WO PELVIS 2-3 VIEW RIGHT-, XR FEMUR 2 VW RIGHT-     INDICATIONS:     Trauma     TECHNIQUE: FRONTAL VIEW OF THE PELVIS, 2 VIEWS OF THE RIGHT HIP, FRONTAL  AND LATERAL VIEWS OF THE RIGHT FEMUR     COMPARISON: None available     FINDINGS:      No acute fracture is identified. No dislocation. Mild right hip joint  space narrowing is seen. Degenerative changes are seen in the visualized  lower lumbar spine. Arterial calcifications are conspicuous.       Impression:          No acute fracture is identified. If there is further clinical concern,  MRI could be considered for further evaluation.           This report was finalized on 1/17/2018 4:50 PM by Dr. Greg Iglesias MD.       XR Chest 1 View [[id number]] Collected:  01/17/18 2052     Updated:  01/17/18 2056    Narrative:       PORTABLE CHEST 1/17/2018 AT 8:42 PM     CLINICAL HISTORY: Preop chest x-ray. Hypertension.     The lungs are well-expanded and appear free of acute infiltrates. There  are no pleural effusions. The heart is normal in size. The thoracic  aorta is mildly tortuous.     IMPRESSIONS: No evidence of active disease within the chest.     This report was finalized on 1/17/2018 8:53 PM by Dr. Nehemias Chua MD.           Patient Active Problem List   Diagnosis Code   • Knee pain M25.569   • Pain in both wrists M25.531, M25.532   • Hypertension I10   • Hyperlipidemia E78.5   • Pain of left lower extremity M79.605   • Lumbar radiculopathy M54.16   • Nail biting F98.8   • Disuse syndrome R53.81   • Pain in wrist M25.539   • Non-insulin dependent type 2 diabetes mellitus E11.9   • Lumbar spinal stenosis M48.061   • Strain of left knee S86.912A   • Knee injury S89.90XA   • History of fall Z91.81   • Muscle  spasms of neck M62.838   • Former smoker Z87.891   • ED (erectile dysfunction) N52.9   • Medicare annual wellness visit, initial Z00.00   • Nocturia R35.1   • Hx of colonic polyps Z86.010   • Dyspnea on exertion R06.09   • Fatigue R53.83   • Rupture of right quadriceps tendon S76.111A       Assessment:  Active Hospital Problems (** Indicates Principal Problem)    Diagnosis Date Noted   • **Rupture of right quadriceps tendon [S76.111A] 01/17/2018   • Hypertension [I10] 03/04/2016   • Hyperlipidemia [E78.5] 03/04/2016   • Non-insulin dependent type 2 diabetes mellitus [E11.9] 03/04/2016      Resolved Hospital Problems    Diagnosis Date Noted Date Resolved   No resolved problems to display.       Plan:  The patient admitted hospital orthopedic surgery consult.  Preop lab appears satisfactory and patient is cleared for surgery as planned.    Brett Alston MD  1/17/2018  10:56 PM

## 2018-01-18 NOTE — ANESTHESIA POSTPROCEDURE EVALUATION
Patient: Jenaro Haji JrVivek    Procedure Summary     Date Anesthesia Start Anesthesia Stop Room / Location    01/18/18 1231 1401  [facility] / [facility]       Procedure Diagnosis Surgeon Provider    QUADRICEPS TENDON REPAIR (Right Thigh) Rupture of right quadriceps tendon, initial encounter  (Right quadriceps tendon tear) MD Sam Mcgowan MD          Anesthesia Type: general  Last vitals  BP   112/67 (01/18/18 1515)   Temp   36.5 °C (97.7 °F) (01/18/18 1445)   Pulse   84 (01/18/18 1515)   Resp   16 (01/18/18 1515)     SpO2   95 % (01/18/18 1515)     Post Anesthesia Care and Evaluation    Patient location during evaluation: bedside  Patient participation: complete - patient participated  Level of consciousness: awake  Pain management: adequate  Airway patency: patent  Anesthetic complications: No anesthetic complications    Cardiovascular status: acceptable  Respiratory status: acceptable  Hydration status: acceptable

## 2018-01-18 NOTE — BRIEF OP NOTE
QUADRICEPS TENDON REPAIR  Progress Note    Jenaro Haji Jr.  1/17/2018 - 1/18/2018    Pre-op Diagnosis:   Right quadriceps tendon tear       Post-Op Diagnosis Codes:     * Rupture of right quadriceps tendon, initial encounter [S76.111A]    Procedure/CPT® Codes:      Procedure(s):  QUADRICEPS TENDON REPAIR    Surgeon(s):  Brett Mclaughlin MD    Anesthesia: General    Staff:   Circulator: Monica De La Cruz RN; Iesha Hoffman RN  Scrub Person: Eva Pan  Assistant: Quique Mendoza MD    Estimated Blood Loss: none    Urine Voided: * No values recorded between 1/18/2018 12:31 PM and 1/18/2018  1:58 PM *    Specimens:                None      Drains:           Findings: see dict     Complications: andry Mclaughlin MD     Date: 1/18/2018  Time: 2:40 PM

## 2018-01-18 NOTE — ANESTHESIA PREPROCEDURE EVALUATION
Anesthesia Evaluation     Patient summary reviewed and Nursing notes reviewed   NPO Solid Status: > 8 hours  NPO Liquid Status: > 8 hours     Airway   Mallampati: III  possible difficult intubation  Dental - normal exam     Pulmonary     breath sounds clear to auscultation  (+) shortness of breath,   Cardiovascular     Rhythm: regular    (+) hypertension, hyperlipidemia      Neuro/Psych  (+) numbness,     GI/Hepatic/Renal/Endo    (+)  diabetes mellitus,     Musculoskeletal     Abdominal    Substance History      OB/GYN          Other                                                Anesthesia Plan    ASA 3     general   (Femoral nerve block)  intravenous induction   Anesthetic plan and risks discussed with patient.

## 2018-01-18 NOTE — PLAN OF CARE
Problem: Patient Care Overview (Adult)  Goal: Plan of Care Review  Outcome: Ongoing (interventions implemented as appropriate)   01/17/18 2135   Coping/Psychosocial Response Interventions   Plan Of Care Reviewed With patient   Patient Care Overview   Progress no change   Outcome Evaluation   Outcome Summary/Follow up Plan received from the Er with ruptured tendon, knee immobilizerin place, NWB, VSS, voices adequate pain control, will have surgical repair with Dr. Mclaughlin tomorrow, educated on the importance of BG monitoringrelatedto history ofdiabetes     Goal: Adult Individualization and Mutuality  Outcome: Ongoing (interventions implemented as appropriate)   01/17/18 2135   Individualization   Patient Specific Preferences none stated     Goal: Discharge Needs Assessment  Outcome: Ongoing (interventions implemented as appropriate)   01/17/18 2135   Discharge Needs Assessment   Concerns To Be Addressed basic needs concerns   Readmission Within The Last 30 Days no previous admission in last 30 days   Discharge Disposition still a patient   Current Health   Anticipated Changes Related to Illness inability to care for self   Living Environment   Transportation Available car;family or friend will provide       Problem: Fall Risk (Adult)  Goal: Identify Related Risk Factors and Signs and Symptoms  Outcome: Outcome(s) achieved Date Met: 01/17/18 01/17/18 2135   Fall Risk   Fall Risk: Related Risk Factors culprit medication(s);environment unfamiliar   Fall Risk: Signs and Symptoms presence of risk factors     Goal: Absence of Falls  Outcome: Ongoing (interventions implemented as appropriate)   01/17/18 2135   Fall Risk (Adult)   Absence of Falls achieves outcome       Problem: Mobility, Physical Impaired (Adult)  Goal: Identify Related Risk Factors and Signs and Symptoms  Outcome: Outcome(s) achieved Date Met: 01/17/18 01/17/18 2135   Mobility, Physical Impaired   Physical Mobility, Impaired: Related Risk Factors  pain/discomfort   Signs and Symptoms (Physical Mobility Impaired) decreased balance;unsafe transfers/ambulation     Goal: Enhanced Mobility Skills  Outcome: Ongoing (interventions implemented as appropriate)   01/17/18 2135   Mobility, Physical Impaired (Adult)   Enhanced Mobility Skills making progress toward outcome     Goal: Enhanced Functionality Ability  Outcome: Ongoing (interventions implemented as appropriate)   01/17/18 2135   Mobility, Physical Impaired (Adult)   Enhanced Functionality Ability making progress toward outcome       Problem: Pain, Acute (Adult)  Goal: Identify Related Risk Factors and Signs and Symptoms  Outcome: Outcome(s) achieved Date Met: 01/17/18 01/17/18 2135   Pain, Acute   Related Risk Factors (Acute Pain) trauma   Signs and Symptoms (Acute Pain) verbalization of pain descriptors     Goal: Acceptable Pain Control/Comfort Level  Outcome: Ongoing (interventions implemented as appropriate)   01/17/18 2135   Pain, Acute (Adult)   Acceptable Pain Control/Comfort Level making progress toward outcome

## 2018-01-18 NOTE — PLAN OF CARE
Problem: Patient Care Overview (Adult)  Goal: Plan of Care Review  Outcome: Ongoing (interventions implemented as appropriate)   01/18/18 1149 01/18/18 1721   Coping/Psychosocial Response Interventions   Plan Of Care Reviewed With patient --    Patient Care Overview   Progress no change --    Outcome Evaluation   Outcome Summary/Follow up Plan --  pt back to unit from pacu at this time. vss. KI in place. drsg c/d/i. nvi. pain is controlled with PO pain medication. wife at bedside. pt maintains NWB status. DTV by 2115. pt demonstates use of IS. discussed wtih pt the importnace of glucose monitoring and medications as ordered with pt h/o DM. will monitor. educated on the importance of bp monitoring and medications as needed.      Goal: Adult Individualization and Mutuality  Outcome: Ongoing (interventions implemented as appropriate)    Goal: Discharge Needs Assessment  Outcome: Ongoing (interventions implemented as appropriate)      Problem: Fall Risk (Adult)  Goal: Identify Related Risk Factors and Signs and Symptoms  Outcome: Ongoing (interventions implemented as appropriate)    Goal: Absence of Falls  Outcome: Ongoing (interventions implemented as appropriate)      Problem: Mobility, Physical Impaired (Adult)  Goal: Identify Related Risk Factors and Signs and Symptoms  Outcome: Ongoing (interventions implemented as appropriate)    Goal: Enhanced Mobility Skills  Outcome: Ongoing (interventions implemented as appropriate)    Goal: Enhanced Functionality Ability  Outcome: Ongoing (interventions implemented as appropriate)      Problem: Pain, Acute (Adult)  Goal: Acceptable Pain Control/Comfort Level  Outcome: Ongoing (interventions implemented as appropriate)      Problem: Perioperative Period (Adult)  Goal: Signs and Symptoms of Listed Potential Problems Will be Absent or Manageable (Perioperative Period)  Outcome: Ongoing (interventions implemented as appropriate)

## 2018-01-18 NOTE — OP NOTE
ORTHOPAEDIC OPERATIVE NOTE    Patient: Jenaro Haji Jr.  YOB: 1945    Medical Record Number: [id number]    Attending Physician: Rakesh Hurtado MD    Primary Care Physician: Suyapa Fitzpatrick MD    DATE OF PROCEDURE: 1/18/2018       DATE OF PROCEDURE: 1/18/2018    Pre-op Diagnosis:   Right quadriceps tendon tear    Post-Op Diagnosis Codes:     * Rupture of right quadriceps tendon, initial encounter [S76.111A]    PROCEDURE PERFORMED: Right  QUADRICEPS TENDON REPAIR     SURGEON: Brett Mclaughlin MD     ASSISTANT:  Quique Mendoza MD, Fellow  The services of a skilled  first assist were necessary for performing the procedure safely and expeditiously.  The first assist was present for the entire duration of the case and helped with positioning, retraction and closure of the incision.      ANESTHESIA:  General anaesthesia And femoral nerve block.     ESTIMATED BLOOD LOSS: minimal    SPECIMENS:  Nil.     COMPLICATIONS:  Nil.     DRAINS: Nil    INDICATIONS: The patient is a 73 y.o. male who presneted to [facility] emergency room with complaints of inability to extend right knee. The problem started when he slipped on stairs at home.  He was evaluated in the  Emergency room and was diagnosed with a quadriceps tendon tear. Treatment options and alternatives were discussed in detail with the patient who is indicated for an open repair of quadriceps tendon tear. Likely risks and benefits of the procedure, including but not limited to infection, DVT, pulmonary embolism, posttraumatic stiffness, posttraumatic arthritis , rerupture, persistent weakness of extension, persistent quadriceps atrophy have been discussed in detail. Despite the risks involved, the patient elected to proceed and informed consent was obtained and the patient was scheduled for surgery. The patient was seen in the preoperative holding area and the operative site was marked.   He has  a history of diabetes mellitus and medical comorbidities.    DESCRIPTION OF PROCEDURE:   The patient was transferred to [facility] operating room. Preoperative antibiotics in the form of Kefzol  intravenously was infused prior to the incision and prior to the tourniquet placement according to the SCIP protocol. A surgical time out was done with the team and the correct patient, surgical side and site were identified.   After achieving adequate general anesthesia, a well-padded tourniquet was placed over the proximal aspect of the thigh. The  leg was prepped and draped in the usual sterile fashion. Tourniquet was elevated to a pressure of 250 mmHg. A skin incision was made centering over the patella vertically oriented anteriorly. Skin and subcutaneous tissue were incised and the quadriceps tendon was found to be completely avulsed from the superior pole of the patella. The end of the tendon was identified. There was evidence of dystrophic calcification at the end of the tendon. This was excised. The knee joint was inspected. There was no evidence of any loose bodies or debris. The joint was thoroughly irrigated with saline. The proximal pole of the patella was freshened by utilizing a rongeur. The insertion of the quadriceps tendon was identified and freshened. FiberWire suture #2 was utilized in a modified [place] stitch and 2 separate sutures were placed to bring about 4 strands of suture at the end of the tendon. 3 vertical drill holes were made in the patella parallel to the articular surface and parallel to each other. The sutures were passed utilizing a suture passer and brought out on the inferior pole of the patella. The knee was placed in extension and the sutures were tied approximating the proximal end of the superior pole of the patella to the quadriceps tendon securely. This was checked by ranging the knee up to 60° there was no separation of the tendon bone interface. The medial and  lateral extensor retinaculum was inspected. There were extensive tears of both the retinaculum up to the midline both medially and laterally. The retinaculum was repaired utilizing #1 Vicryl sutures. Incision was closed in layers with Monocryl sutures for the subcuticular layer. Steri-Strips were applied. Sterile dressings were applied. The knee was placed into a hinged knee brace locked at 0°.  The patient tolerated the procedure well. There were no complications. He had adequate distal pulses and good capillary refill. He is being Transferred to the floor with instructions to keep the operative lower extremity extended at all times. Avoid flexion of the knee. He will avoid firing the quadriceps muscle and will utilize a leg  for transfers. Okay to weight-bear as tolerated with the help of crutches. Okay to remove the dressing in 4 days and have a quick shower. Avoid soaking the incision. Follow-up with me in the office in 2 weeks. He will call the office on [phone].     I discussed the satisfactory performance of the procedure with the patient's family and discussed with them The postoperative management.      Brett Mclaughlin M.D.    1/18/2018    CC: Suyapa Fitzpatrick MD; MD Rakesh Cevallos MD

## 2018-01-18 NOTE — ANESTHESIA PROCEDURE NOTES
Airway  Urgency: elective    Airway not difficult    General Information and Staff    Patient location during procedure: OR  Anesthesiologist: SHANEL JAMA  CRNA: NUSRAT VICK    Indications and Patient Condition  Indications for airway management: airway protection    Preoxygenated: yes  MILS not maintained throughout  Mask difficulty assessment: 1 - vent by mask    Final Airway Details  Final airway type: endotracheal airway      Successful airway: ETT  Cuffed: yes   Successful intubation technique: direct laryngoscopy  Facilitating devices/methods: cricoid pressure  Endotracheal tube insertion site: oral  Blade: Osmin  Blade size: #4  ETT size: 7.5 mm  Cormack-Lehane Classification: grade IIb - view of arytenoids or posterior of glottis only  Placement verified by: chest auscultation and capnometry   Measured from: lips  ETT to lips (cm): 23  Number of attempts at approach: 1    Additional Comments  Dentition intact and unchanged. CBEBS.  +ETCO2.

## 2018-01-19 VITALS
WEIGHT: 180 LBS | HEIGHT: 67 IN | DIASTOLIC BLOOD PRESSURE: 72 MMHG | OXYGEN SATURATION: 95 % | HEART RATE: 76 BPM | SYSTOLIC BLOOD PRESSURE: 121 MMHG | BODY MASS INDEX: 28.25 KG/M2 | TEMPERATURE: 99 F | RESPIRATION RATE: 16 BRPM

## 2018-01-19 LAB
ANION GAP SERPL CALCULATED.3IONS-SCNC: 12.3 MMOL/L
BASOPHILS # BLD AUTO: 0.02 10*3/MM3 (ref 0–0.2)
BASOPHILS NFR BLD AUTO: 0.2 % (ref 0–1.5)
BUN BLD-MCNC: 11 MG/DL (ref 8–23)
BUN/CREAT SERPL: 14.3 (ref 7–25)
CALCIUM SPEC-SCNC: 7.8 MG/DL (ref 8.6–10.5)
CHLORIDE SERPL-SCNC: 95 MMOL/L (ref 98–107)
CO2 SERPL-SCNC: 26.7 MMOL/L (ref 22–29)
CREAT BLD-MCNC: 0.77 MG/DL (ref 0.76–1.27)
DEPRECATED RDW RBC AUTO: 46.1 FL (ref 37–54)
EOSINOPHIL # BLD AUTO: 0.01 10*3/MM3 (ref 0–0.7)
EOSINOPHIL NFR BLD AUTO: 0.1 % (ref 0.3–6.2)
ERYTHROCYTE [DISTWIDTH] IN BLOOD BY AUTOMATED COUNT: 13.4 % (ref 11.5–14.5)
GFR SERPL CREATININE-BSD FRML MDRD: 99 ML/MIN/1.73
GLUCOSE BLD-MCNC: 175 MG/DL (ref 65–99)
GLUCOSE BLDC GLUCOMTR-MCNC: 171 MG/DL (ref 70–130)
GLUCOSE BLDC GLUCOMTR-MCNC: 191 MG/DL (ref 70–130)
HCT VFR BLD AUTO: 33.7 % (ref 40.4–52.2)
HGB BLD-MCNC: 10.8 G/DL (ref 13.7–17.6)
IMM GRANULOCYTES # BLD: 0.03 10*3/MM3 (ref 0–0.03)
IMM GRANULOCYTES NFR BLD: 0.3 % (ref 0–0.5)
LYMPHOCYTES # BLD AUTO: 0.97 10*3/MM3 (ref 0.9–4.8)
LYMPHOCYTES NFR BLD AUTO: 8.9 % (ref 19.6–45.3)
MCH RBC QN AUTO: 30.1 PG (ref 27–32.7)
MCHC RBC AUTO-ENTMCNC: 32 G/DL (ref 32.6–36.4)
MCV RBC AUTO: 93.9 FL (ref 79.8–96.2)
MONOCYTES # BLD AUTO: 1.05 10*3/MM3 (ref 0.2–1.2)
MONOCYTES NFR BLD AUTO: 9.6 % (ref 5–12)
NEUTROPHILS # BLD AUTO: 8.84 10*3/MM3 (ref 1.9–8.1)
NEUTROPHILS NFR BLD AUTO: 80.9 % (ref 42.7–76)
PLATELET # BLD AUTO: 198 10*3/MM3 (ref 140–500)
PMV BLD AUTO: 9.6 FL (ref 6–12)
POTASSIUM BLD-SCNC: 3.8 MMOL/L (ref 3.5–5.2)
RBC # BLD AUTO: 3.59 10*6/MM3 (ref 4.6–6)
SODIUM BLD-SCNC: 134 MMOL/L (ref 136–145)
WBC NRBC COR # BLD: 10.92 10*3/MM3 (ref 4.5–10.7)

## 2018-01-19 PROCEDURE — G8978 MOBILITY CURRENT STATUS: HCPCS

## 2018-01-19 PROCEDURE — 63710000001 HYDROCHLOROTHIAZIDE 12.5 MG CAPSULE: Performed by: HOSPITALIST

## 2018-01-19 PROCEDURE — A9270 NON-COVERED ITEM OR SERVICE: HCPCS | Performed by: HOSPITALIST

## 2018-01-19 PROCEDURE — G8980 MOBILITY D/C STATUS: HCPCS

## 2018-01-19 PROCEDURE — 25010000003 CEFAZOLIN IN DEXTROSE 2-4 GM/100ML-% SOLUTION: Performed by: ORTHOPAEDIC SURGERY

## 2018-01-19 PROCEDURE — 25010000002 HYDROMORPHONE PER 4 MG: Performed by: HOSPITALIST

## 2018-01-19 PROCEDURE — 85025 COMPLETE CBC W/AUTO DIFF WBC: CPT | Performed by: INTERNAL MEDICINE

## 2018-01-19 PROCEDURE — 80048 BASIC METABOLIC PNL TOTAL CA: CPT | Performed by: INTERNAL MEDICINE

## 2018-01-19 PROCEDURE — 63710000001 HYDROCODONE-ACETAMINOPHEN 5-325 MG TABLET: Performed by: HOSPITALIST

## 2018-01-19 PROCEDURE — 63710000001 BISACODYL 5 MG TABLET DELAYED-RELEASE: Performed by: ORTHOPAEDIC SURGERY

## 2018-01-19 PROCEDURE — 97162 PT EVAL MOD COMPLEX 30 MIN: CPT

## 2018-01-19 PROCEDURE — G8979 MOBILITY GOAL STATUS: HCPCS

## 2018-01-19 PROCEDURE — 63710000001 METFORMIN 1000 MG TABLET: Performed by: HOSPITALIST

## 2018-01-19 PROCEDURE — 63710000001 INSULIN ASPART PER 5 UNITS: Performed by: HOSPITALIST

## 2018-01-19 PROCEDURE — A9270 NON-COVERED ITEM OR SERVICE: HCPCS | Performed by: ORTHOPAEDIC SURGERY

## 2018-01-19 PROCEDURE — 63710000001 GLIPIZIDE 10 MG TABLET: Performed by: HOSPITALIST

## 2018-01-19 PROCEDURE — 63710000001 VALSARTAN 160 MG TABLET: Performed by: HOSPITALIST

## 2018-01-19 PROCEDURE — 63710000001 THYROID 30 MG TABLET: Performed by: HOSPITALIST

## 2018-01-19 PROCEDURE — 63710000001 HYDROCODONE-ACETAMINOPHEN 7.5-325 MG TABLET: Performed by: ORTHOPAEDIC SURGERY

## 2018-01-19 PROCEDURE — 63710000001 ATORVASTATIN 20 MG TABLET: Performed by: HOSPITALIST

## 2018-01-19 PROCEDURE — 82962 GLUCOSE BLOOD TEST: CPT

## 2018-01-19 PROCEDURE — 63710000001 VITAMIN B-6 50 MG TABLET: Performed by: HOSPITALIST

## 2018-01-19 PROCEDURE — G0378 HOSPITAL OBSERVATION PER HR: HCPCS

## 2018-01-19 RX ORDER — HYDROCODONE BITARTRATE AND ACETAMINOPHEN 7.5; 325 MG/1; MG/1
1 TABLET ORAL EVERY 4 HOURS PRN
Qty: 25 TABLET | Refills: 0 | Status: SHIPPED | OUTPATIENT
Start: 2018-01-19 | End: 2018-01-29

## 2018-01-19 RX ORDER — BISACODYL 5 MG/1
10 TABLET, DELAYED RELEASE ORAL 2 TIMES DAILY PRN
Status: DISCONTINUED | OUTPATIENT
Start: 2018-01-19 | End: 2018-01-19 | Stop reason: HOSPADM

## 2018-01-19 RX ORDER — HYDROCODONE BITARTRATE AND ACETAMINOPHEN 7.5; 325 MG/1; MG/1
1 TABLET ORAL EVERY 4 HOURS PRN
Status: DISCONTINUED | OUTPATIENT
Start: 2018-01-19 | End: 2018-01-19 | Stop reason: HOSPADM

## 2018-01-19 RX ORDER — DOCUSATE SODIUM 100 MG/1
100 CAPSULE, LIQUID FILLED ORAL 2 TIMES DAILY
Status: DISCONTINUED | OUTPATIENT
Start: 2018-01-19 | End: 2018-01-19 | Stop reason: HOSPADM

## 2018-01-19 RX ORDER — PSEUDOEPHEDRINE HCL 30 MG
100 TABLET ORAL 2 TIMES DAILY
Qty: 60 CAPSULE | Refills: 0 | Status: SHIPPED | OUTPATIENT
Start: 2018-01-19 | End: 2018-06-07

## 2018-01-19 RX ORDER — HYDROCODONE BITARTRATE AND ACETAMINOPHEN 7.5; 325 MG/1; MG/1
2 TABLET ORAL EVERY 4 HOURS PRN
Status: DISCONTINUED | OUTPATIENT
Start: 2018-01-19 | End: 2018-01-19 | Stop reason: HOSPADM

## 2018-01-19 RX ADMIN — HYDROCODONE BITARTRATE AND ACETAMINOPHEN 2 TABLET: 7.5; 325 TABLET ORAL at 10:07

## 2018-01-19 RX ADMIN — HYDROCODONE BITARTRATE AND ACETAMINOPHEN 1 TABLET: 5; 325 TABLET ORAL at 03:29

## 2018-01-19 RX ADMIN — METFORMIN HYDROCHLORIDE 1000 MG: 1000 TABLET ORAL at 08:12

## 2018-01-19 RX ADMIN — GLIPIZIDE 10 MG: 10 TABLET ORAL at 08:12

## 2018-01-19 RX ADMIN — ATORVASTATIN CALCIUM 40 MG: 20 TABLET, FILM COATED ORAL at 08:16

## 2018-01-19 RX ADMIN — HYDROCODONE BITARTRATE AND ACETAMINOPHEN 1 TABLET: 5; 325 TABLET ORAL at 08:12

## 2018-01-19 RX ADMIN — HYDROMORPHONE HYDROCHLORIDE 0.5 MG: 1 INJECTION, SOLUTION INTRAMUSCULAR; INTRAVENOUS; SUBCUTANEOUS at 02:21

## 2018-01-19 RX ADMIN — VALSARTAN 160 MG: 160 TABLET ORAL at 08:12

## 2018-01-19 RX ADMIN — HYDROCHLOROTHIAZIDE 12.5 MG: 12.5 CAPSULE, GELATIN COATED ORAL at 08:12

## 2018-01-19 RX ADMIN — PYRIDOXINE HCL TAB 50 MG 100 MG: 50 TAB at 08:13

## 2018-01-19 RX ADMIN — LEVOTHYROXINE, LIOTHYRONINE 30 MG: 19; 4.5 TABLET ORAL at 08:12

## 2018-01-19 RX ADMIN — BISACODYL 10 MG: 5 TABLET, COATED ORAL at 10:08

## 2018-01-19 RX ADMIN — CEFAZOLIN SODIUM 2 G: 2 INJECTION, SOLUTION INTRAVENOUS at 04:49

## 2018-01-19 RX ADMIN — HYDROCODONE BITARTRATE AND ACETAMINOPHEN 2 TABLET: 7.5; 325 TABLET ORAL at 14:59

## 2018-01-19 NOTE — PLAN OF CARE
Problem: Patient Care Overview (Adult)  Goal: Plan of Care Review  Outcome: Ongoing (interventions implemented as appropriate)   01/19/18 0239   Coping/Psychosocial Response Interventions   Plan Of Care Reviewed With patient   Patient Care Overview   Progress progress toward functional goals as expected   Outcome Evaluation   Outcome Summary/Follow up Plan PO AND IV PAIN MEDICATION HELPING WITH PAIN. AMBULATING PER 1 ASSIST. VOIDING FINE. KNEE IMMOBILIZER IN PLACE. . EDUCATION PROVIDED ON THE IMPORTANCE OF BLOOD PRESSURE MONITORING AND TAKING ANTIHYPERTENSIVE AS ORDERED.     Goal: Adult Individualization and Mutuality  Outcome: Ongoing (interventions implemented as appropriate)    Goal: Discharge Needs Assessment  Outcome: Ongoing (interventions implemented as appropriate)      Problem: Fall Risk (Adult)  Goal: Identify Related Risk Factors and Signs and Symptoms  Outcome: Outcome(s) achieved Date Met: 01/19/18    Goal: Absence of Falls  Outcome: Ongoing (interventions implemented as appropriate)      Problem: Pain, Acute (Adult)  Goal: Acceptable Pain Control/Comfort Level  Outcome: Ongoing (interventions implemented as appropriate)      Problem: Perioperative Period (Adult)  Goal: Signs and Symptoms of Listed Potential Problems Will be Absent or Manageable (Perioperative Period)  Outcome: Ongoing (interventions implemented as appropriate)

## 2018-01-19 NOTE — PLAN OF CARE
Problem: Patient Care Overview (Adult)  Goal: Plan of Care Review   01/19/18 9535   Coping/Psychosocial Response Interventions   Plan Of Care Reviewed With patient;spouse   Outcome Evaluation   Outcome Summary/Follow up Plan Patient is a pleasant 73 y.o. male who slipped on a stair at home and sustained a quadricep tendon rupture. s/p R quad tendon rupture- patient to wear KI at all times, WBAT and no firing of the quads. Ambulated 175' with RW and SV. Stair training complete. Based on clinical presentation, patient safe to dischrage home at this time with assistance from spouse. Would recommend  PT services at [facility].

## 2018-01-19 NOTE — THERAPY EVALUATION
Acute Care - Physical Therapy Evaluation/Discharge  [facility]     Patient Name: Jenaro Haji Jr.  : 1945  MRN: [id number]  Today's Date: 2018   Onset of Illness/Injury or Date of Surgery Date: 18 (s/p R quadriceps tendon repair)  Date of Referral to PT: 18  Referring Physician: Dr. Mclaughlin      Admit Date: 2018     Visit Dx:    ICD-10-CM ICD-9-CM   1. Rupture of right quadriceps tendon, initial encounter S76.111A 843.8     Patient Active Problem List   Diagnosis   • Knee pain   • Pain in both wrists   • Hypertension   • Hyperlipidemia   • Pain of left lower extremity   • Lumbar radiculopathy   • Nail biting   • Disuse syndrome   • Pain in wrist   • Non-insulin dependent type 2 diabetes mellitus   • Lumbar spinal stenosis   • Strain of left knee   • Knee injury   • History of fall   • Muscle spasms of neck   • Former smoker   • ED (erectile dysfunction)   • Medicare annual wellness visit, initial   • Nocturia   • Hx of colonic polyps   • Dyspnea on exertion   • Fatigue   • Rupture of right quadriceps tendon     Past Medical History:   Diagnosis Date   • Chronic kidney disease    • Diabetes mellitus    • Hyperlipidemia    • Hypertension    • Kidney stone    • Lumbar spinal stenosis    • Nocturia    • Non-insulin dependent type 2 diabetes mellitus    • Pain in both wrists    • Thyroid disease      Past Surgical History:   Procedure Laterality Date   • COLONOSCOPY  2011   • COLONOSCOPY N/A 2017    Procedure: COLONOSCOPY TO CECUM/TI WITH POLYPECTOMY ( COLD BX);  Surgeon: Kenan Galindo MD;  Location: [facility] ENDOSCOPY;  Service:    • HERNIA REPAIR     • INGUINAL HERNIA REPAIR     • QUADRICEPS TENDON REPAIR Right 2018    Procedure: QUADRICEPS TENDON REPAIR;  Surgeon: Brett Mclaughlin MD;  Location: [facility] MAIN OR;  Service:           PT ASSESSMENT (last 72 hours)      PT Evaluation       18 1400 18 1102    Rehab Evaluation    Document Type  evaluation  -MA     Subjective Information agree to therapy;complains of;pain  -MA     Patient Effort, Rehab Treatment good  -MA     Symptoms Noted During/After Treatment increased pain;fatigue  -MA     General Information    Patient Profile Review yes  -MA     Onset of Illness/Injury or Date of Surgery Date 01/18/18   s/p R quadriceps tendon repair  -MA     Referring Physician Dr. Mclaughlin  -MA     General Observations supine in bed with HOB elevated, KI on, spouse at bedside, no acute distress noted  -MA     Pertinent History Of Current Problem Admitted for fall on stair at home- s/p R quadricep tendon repair  -MA     Precautions/Limitations fall precautions;brace on when up   WBAT, no knee flexion, no firing quads  -MA     Prior Level of Function independent:;all household mobility;using stairs  -MA     Equipment Currently Used at Home  walker, standard;walker, rolling;bath bench;raised toilet;crutches;glucometer  -    Plans/Goals Discussed With patient;spouse/S.O.;agreed upon  -MA     Benefits Reviewed patient:;spouse/S.O.:;improve function;increase independence  -MA     Barriers to Rehab none identified  -MA     Living Environment    Lives With  spouse  -JW    Living Arrangements  house  -JW    Home Accessibility  stairs to enter home;stairs within home  -JW    Number of Stairs to Enter Home  2  -JW    Number of Stairs Within Home  --   states there are stairs to the basement family room but he will not be going downstairs when he gets home  -JW    Stair Railings at Home  none  -JW    Type of Financial/Environmental Concern  none  -JW    Transportation Available  car;family or friend will provide  -JW    Clinical Impression    Date of Referral to PT 01/19/18  -MA     PT Diagnosis impaired functional mobility 2' post op  -MA     Criteria for Skilled Therapeutic Interventions Met no;no problems identified which require skilled intervention  -MA     Pathology/Pathophysiology Noted (Describe Specifically for Each  System) musculoskeletal  -MA     Impairments Found (describe specific impairments) aerobic capacity/endurance;gait, locomotion, and balance;ROM  -MA     Vital Signs    O2 Delivery Pre Treatment room air  -MA     Pain Assessment    Pain Assessment [place] FACES  -MA     [place] FACES Pain Rating 4  -MA     Pain Type Surgical pain  -MA     Pain Location Leg  -MA     Pain Orientation Right  -MA     Pain Intervention(s) Repositioned;Ambulation/increased activity;Rest  -MA     Vision Assessment/Intervention    Visual Impairment WFL with corrective lenses  -MA     Cognitive Assessment/Intervention    Current Cognitive/Communication Assessment functional  -MA     Orientation Status oriented x 4  -MA     Follows Commands/Answers Questions 100% of the time;able to follow multi-step instructions  -MA     Personal Safety WNL/WFL;good awareness, safety precautions  -MA     Personal Safety Interventions fall prevention program maintained;gait belt;nonskid shoes/slippers when out of bed  -MA     ROM (Range of Motion)    General ROM Detail R LE in KI  -MA     MMT (Manual Muscle Testing)    General MMT Assessment Detail R LE post op weakness  -MA     Mobility Assessment/Training    Extremity Weight-Bearing Status right lower extremity  -MA     Right Lower Extremity Weight-Bearing weight-bearing as tolerated   w brace on  -MA     Bed Mobility, Assessment/Treatment    Bed Mobility, Assistive Device bed rails;head of bed elevated  -MA     Bed Mob, Supine to Sit, [place] contact guard assist  -MA     Bed Mob, Sit to Supine, [place] not tested  -MA     Bed Mobility, Impairments pain;strength decreased  -MA     Transfer Assessment/Treatment    Transfers, Sit-Stand [place] supervision required  -MA     Transfers, Stand-Sit [place] supervision required  -MA     Transfers, Sit-Stand-Sit, Assist Device rolling walker  -MA     Transfer, Safety Issues weight-shifting ability decreased  -MA     Transfer, Impairments  pain;strength decreased  -MA     Transfer, Comment Cues for hand placement  -MA     Gait Assessment/Treatment    Gait, [place] Level supervision required  -MA     Gait, Assistive Device rolling walker  -MA     Gait, Distance (Feet) 175  -MA     Gait, Gait Pattern Analysis 3-point gait  -MA     Gait, Gait Deviations right:;antalgic;step length decreased;stride length decreased;skip decreased;decreased heel strike  -MA     Gait, Safety Issues weight-shifting ability decreased;sequencing ability decreased;loses balance backward  -MA     Gait, Impairments pain;strength decreased;impaired balance  -MA     Gait, Comment Distance limited 2' to fatigue  -MA     Positioning and Restraints    Pre-Treatment Position in bed  -MA     Post Treatment Position chair  -MA     In Chair notified nsg;sitting;call light within reach;encouraged to call for assist;with family/caregiver;legs elevated;R knee immobilizer  -MA       01/19/18 0239 01/17/18 2135    General Information    Equipment Currently Used at Home walker, standard  -QN     Living Environment    Transportation Available car;family or friend will provide  -QN car;family or friend will provide  -NL      01/17/18 2000       General Information    Equipment Currently Used at Home glucometer  -HB     Living Environment    Lives With spouse  -HB     Living Arrangements house  -HB     Home Accessibility stairs within home  -HB     Number of Stairs Within Home 3  -HB     Stair Railings at Home none  -HB     Type of Financial/Environmental Concern none  -HB     Transportation Available car;family or friend will provide  -HB       User Key  (r) = Recorded By, (t) = Taken By, (c) = Cosigned By    Initials Name Provider Type    HB Yvette Brad Ramirez, RN Registered Nurse    NL Justine Matthews RN Registered Nurse    MALINI Rhodes RN Registered Nurse    DELFINO Diaz, RN Case Manager    EDUARDO Shrestha, PT Physical Therapist          Physical Therapy Education      Title: PT OT SLP Therapies (Resolved)     Topic: Physical Therapy (Resolved)     Point: Mobility training (Resolved)    Learning Progress Summary    Learner Readiness Method Response Comment Documented by Status   Patient Acceptance [facility] 01/19/18 1412 Done   Significant Other Acceptance E [facility] 01/19/18 1412 Done               Point: Home exercise program (Resolved)    Learning Progress Summary    Learner Readiness Method Response Comment Documented by Status   Patient Acceptance [facility] 01/19/18 1412 Done   Significant Other Acceptance [facility] 01/19/18 1412 Done               Point: Body mechanics (Resolved)    Learning Progress Summary    Learner Readiness Method Response Comment Documented by Status   Patient Acceptance [facility] 01/19/18 1412 Done   Significant Other Acceptance [facility] 01/19/18 1412 Done               Point: Precautions (Resolved)    Learning Progress Summary    Learner Readiness Method Response Comment Documented by Status   Patient Acceptance [facility] 01/19/18 1412 Done   Significant Other Acceptance E [facility] 01/19/18 1412 Done                      User Key     Initials Effective Dates Name Provider Type Discipline    MA 12/13/16 -  Cony Shrestha, PT Physical Therapist PT                PT Recommendation and Plan  Anticipated Equipment Needs At Discharge:  (patient reported he has a RW)  Anticipated Discharge Disposition: home with assist, home with home health  PT Frequency: evaluation only  Plan of Care Review  Plan Of Care Reviewed With: patient, spouse  Outcome Summary/Follow up Plan: Patient is a pleasant 73 y.o. male who slipped on a stair at home and sustained a quadricep tendon rupture. s/p R quad tendon rupture- patient to wear KI at all times, WBAT and no firing of the quads. Ambulated 175' with RW and SV. Stair training complete. Based on clinical presentation, patient safe to dischrage home at this time with assistance from spouse. Would recommend  PT services at  [facility].              Outcome Measures       01/19/18 1400          How much help from another person do you currently need...    Turning from your back to your side while in flat bed without using bedrails? 4  -MA      Moving from lying on back to sitting on the side of a flat bed without bedrails? 4  -MA      Moving to and from a bed to a chair (including a wheelchair)? 4  -MA      Standing up from a chair using your arms (e.g., wheelchair, bedside chair)? 4  -MA      Climbing 3-5 steps with a railing? 3  -MA      To walk in hospital room? 4  -MA      AM-PAC 6 Clicks Score 23  -MA      Functional Assessment    Outcome Measure Options AM-PAC 6 Clicks Basic Mobility (PT)  -MA        User Key  (r) = Recorded By, (t) = Taken By, (c) = Cosigned By    Initials Name Provider Type    EDUARDO Shrestha PT Physical Therapist           Time Calculation:         PT Charges       01/19/18 1403          Time Calculation    Start Time 1335  -MA      Stop Time 1402  -MA      Time Calculation (min) 27 min  -MA      PT Received On 01/19/18  -MA        User Key  (r) = Recorded By, (t) = Taken By, (c) = Cosigned By    Initials Name Provider Type    EDUARDO Shrestha PT Physical Therapist          Therapy Charges for Today     Code Description Service Date Service Provider Modifiers Qty    05162541626 HC PT EVAL MOD COMPLEXITY 1 1/19/2018 Cony Shrestha PT GP 1    82541304355 HC PT THER SUPP EA 15 MIN 1/19/2018 Cony Shrestha PT GP 1          PT G-Codes  Outcome Measure Options: AM-PAC 6 Clicks Basic Mobility (PT)      Cony Shrestha PT  1/19/2018

## 2018-01-19 NOTE — PROGRESS NOTES
Continued Stay Note  [facility]     Patient Name: Jenaro Haji Jr.  MRN: [id number]  Today's Date: 1/19/2018    Admit Date: 1/17/2018          Discharge Plan       01/19/18 1422    Case Management/Social Work Plan    Plan Home with wife and [facility]    Patient/Family In Agreement With Plan yes    Additional Comments Noted plans for discharge. Followed up with patient and wife and they would like a  referral and a rolling walker. They have no preference to provider. Called referrals to Vonnie/[facility] and Joey. Patient plans home today with wife's assist.     1/19/18 1520 - Joey here with patient's rolling walker.               Discharge Codes     None        Expected Discharge Date and Time     Expected Discharge Date Expected Discharge Time    Jan 19, 2018             Malena De La Cruz RN

## 2018-01-19 NOTE — PROGRESS NOTES
Orthopedic Progress Note      Patient: Jenaro Haji Jr.  YOB: 1945     Date of Admission: 1/17/2018  4:51 PM Medical Record Number: [id number]     Attending Physician: Rakesh Hurtado MD    Status Post:  Right  QUADRICEPS TENDON REPAIR Post Operative Day Number: 1    Subjective : No new orthopaedic complaints     Pain Relief: some relief with present medication.     Systemic Complaints: No Complaints  Vitals:    01/18/18 1900 01/18/18 2300 01/19/18 0300 01/19/18 0711   BP: 124/74 128/71 134/78 129/73   BP Location: Left arm Left arm Left arm Right arm   Patient Position: Lying Lying Lying Sitting   Pulse: 101 96 99 87   Resp: 16 16 16 14   Temp: 99.5 °F (37.5 °C) 99.8 °F (37.7 °C)  97.6 °F (36.4 °C)   TempSrc: Oral Oral Oral Oral   SpO2: 98% 97% 93% 96%   Weight:       Height:           Physical Exam: 73 y.o. male    General Appearance:       Alert, cooperative, in no acute distress                  Extremities:    Dressing Clean, Dry and Intact         Incision healthy without signs or symptoms of infections         No clinical sign of DVT        Able to do good movements of digits    Pulses:   Pulses palpable and equal bilaterally           Diagnostic Tests:       Results from last 7 days  Lab Units 01/19/18 0429 01/18/18 0318 01/17/18 2025   WBC 10*3/mm3 10.92* 6.72 8.66   HEMOGLOBIN g/dL 10.8* 11.7* 12.5*   HEMATOCRIT % 33.7* 36.5* 38.6*   PLATELETS 10*3/mm3 198 183 179       Results from last 7 days  Lab Units 01/19/18  0429 01/18/18 0317 01/17/18 2025   SODIUM mmol/L 134* 135* 139   POTASSIUM mmol/L 3.8 4.0 3.7   CHLORIDE mmol/L 95* 97* 97*   CO2 mmol/L 26.7 29.6* 29.9*   BUN mg/dL 11 14 15   CREATININE mg/dL 0.77 0.85 0.72*   GLUCOSE mg/dL 175* 144* 95   CALCIUM mg/dL 7.8* 8.6 9.0       Results from last 7 days  Lab Units 01/17/18 2025   INR  1.07   APTT seconds 28.7     No results found for: CRYSTAL]  No results found for: CULTURE]  No results found for: URICACID]  Xr Femur 2  View Right    Result Date: 1/17/2018  Narrative: XR HIP W OR WO PELVIS 2-3 VIEW RIGHT-, XR FEMUR 2 VW RIGHT-  INDICATIONS:     Trauma  TECHNIQUE: FRONTAL VIEW OF THE PELVIS, 2 VIEWS OF THE RIGHT HIP, FRONTAL AND LATERAL VIEWS OF THE RIGHT FEMUR  COMPARISON: None available  FINDINGS:   No acute fracture is identified. No dislocation. Mild right hip joint space narrowing is seen. Degenerative changes are seen in the visualized lower lumbar spine. Arterial calcifications are conspicuous.      Impression:  No acute fracture is identified. If there is further clinical concern, MRI could be considered for further evaluation.    This report was finalized on 1/17/2018 4:50 PM by Dr. Greg Iglesias MD.      Xr Knee 1 Or 2 View Right    Result Date: 1/18/2018  Narrative: Right Knee 2 Views 01/18/2018  HISTORY: Right quadriceps tendon repair.  There is soft tissue air in the region of the quadriceps tendon. The patella appears normal in position with no patella fractures. Tibiofemoral joint space appears well-maintained. There is some arterial calcification.      Impression: Satisfactory postoperative appearance of the right knee.  This report was finalized on 1/18/2018 3:18 PM by Dr. Von Guerin MD.      Xr Chest 1 View    Result Date: 1/17/2018  Narrative: PORTABLE CHEST 1/17/2018 AT 8:42 PM  CLINICAL HISTORY: Preop chest x-ray. Hypertension.  The lungs are well-expanded and appear free of acute infiltrates. There are no pleural effusions. The heart is normal in size. The thoracic aorta is mildly tortuous.  IMPRESSIONS: No evidence of active disease within the chest.  This report was finalized on 1/17/2018 8:53 PM by Dr. Nehemias Chua MD.      Xr Hip With Or Without Pelvis 2 - 3 View Right    Result Date: 1/17/2018  Narrative: XR HIP W OR WO PELVIS 2-3 VIEW RIGHT-, XR FEMUR 2 VW RIGHT-  INDICATIONS:     Trauma  TECHNIQUE: FRONTAL VIEW OF THE PELVIS, 2 VIEWS OF THE RIGHT HIP, FRONTAL AND LATERAL VIEWS OF THE RIGHT  FEMUR  COMPARISON: None available  FINDINGS:   No acute fracture is identified. No dislocation. Mild right hip joint space narrowing is seen. Degenerative changes are seen in the visualized lower lumbar spine. Arterial calcifications are conspicuous.      Impression:  No acute fracture is identified. If there is further clinical concern, MRI could be considered for further evaluation.    This report was finalized on 1/17/2018 4:50 PM by Dr. Greg Iglesias MD.          Current Medications:  Scheduled Meds:  atorvastatin 40 mg Oral Daily   glipiZIDE 10 mg Oral BID AC   valsartan 160 mg Oral Q24H   And      hydrochlorothiazide 12.5 mg Oral Q24H   insulin aspart 0-7 Units Subcutaneous 4x Daily With Meals & Nightly   metFORMIN 1,000 mg Oral BID With Meals   Thyroid 30 mg Oral QAM AC   vitamin B-6 100 mg Oral Daily     Continuous Infusions:   PRN Meds:.•  acetaminophen  •  dextrose  •  dextrose  •  glucagon (human recombinant)  •  HYDROcodone-acetaminophen  •  HYDROmorphone **AND** naloxone  •  ondansetron **OR** ondansetron ODT **OR** ondansetron  •  sodium chloride  •  Insert peripheral IV **AND** sodium chloride    Assessment: Status post  QUADRICEPS TENDON REPAIR    Patient Active Problem List   Diagnosis   • Knee pain   • Pain in both wrists   • Hypertension   • Hyperlipidemia   • Pain of left lower extremity   • Lumbar radiculopathy   • Nail biting   • Disuse syndrome   • Pain in wrist   • Non-insulin dependent type 2 diabetes mellitus   • Lumbar spinal stenosis   • Strain of left knee   • Knee injury   • History of fall   • Muscle spasms of neck   • Former smoker   • ED (erectile dysfunction)   • Medicare annual wellness visit, initial   • Nocturia   • Hx of colonic polyps   • Dyspnea on exertion   • Fatigue   • Rupture of right quadriceps tendon       PLAN:   Continues current post-op course  Dressing Change in am  Mobilize with PT as tolerated per protocol - DO NOT RANGE THE KNEE - DO NOT FIRE QUAD - USE LEG   FOR TRANSFERS - USE KNEE IMMOBILIZER AT ALL TIMES    Weight Bearing: WBAT  Discharge Plan: OK to plan for discharge in  today to home and home health  from orthopadic perspective.    Brett Mclaughlin MD    Date: 1/19/2018    Time: 8:53 AM

## 2018-01-19 NOTE — DISCHARGE SUMMARY
"       Date of Admission: 1/17/2018  Date of Discharge:  1/19/2018    PCP: Suyapa Fitzpatrick MD      DISCHARGE DIAGNOSIS  Principal Problem:    Rupture of right quadriceps tendon  Active Problems:    Hypertension    Hyperlipidemia    Non-insulin dependent type 2 diabetes mellitus      SECONDARY DIAGNOSES  Past Medical History:   Diagnosis Date   • Chronic kidney disease    • Diabetes mellitus    • Hyperlipidemia    • Hypertension    • Kidney stone    • Lumbar spinal stenosis    • Nocturia    • Non-insulin dependent type 2 diabetes mellitus    • Pain in both wrists    • Thyroid disease        CONSULTS   Consults     Date and Time Order Name Status Description    1/18/2018 0015 Inpatient Consult to Orthopedic Surgery      1/17/2018 2001 [facility] (on-call MD unless specified) Completed     1/17/2018 8226 Ortho (on-call MD unless specified) Completed           PROCEDURES PERFORMED  Hip and femur xray, right:  No acute fracture or dislocation    HOSPITAL COURSE  Patient is a 73 y.o. male presented to [facility] complaining of right leg pain after a mechanical fall at home.  Please see the admitting history and physical for further details. He was admitted with a right quadriceps tendon rupture. Ortho was consulted and took him for repair on 1/18/18. He was given the following instructions from ortho:  DO NOT RANGE THE KNEE - DO NOT FIRE QUAD - USE LEG  FOR TRANSFERS - USE KNEE IMMOBILIZER AT ALL TIMES    He will follow up with them as instructed. WBAT to that leg. He will go home with home health       CONDITION ON DISCHARGE  Stable.      VITAL SIGNS  /80 (BP Location: Left arm, Patient Position: Lying)  Pulse 85  Temp 99.3 °F (37.4 °C) (Oral)   Resp 16  Ht 170.2 cm (67\")  Wt 81.6 kg (180 lb)  SpO2 94%  BMI 28.19 kg/m2  Objective:  General Appearance:  Comfortable and well-appearing.    Vital signs: (most recent): Blood pressure 128/80, pulse 85, temperature 99.3 °F (37.4 °C), temperature " "source Oral, resp. rate 16, height 170.2 cm (67\"), weight 81.6 kg (180 lb), SpO2 94 %.  Vital signs are normal.    HEENT: Normal HEENT exam.    Lungs:  Normal effort.  Breath sounds clear to auscultation.    Heart: Normal rate.  Regular rhythm.    Abdomen: Abdomen is soft and non-distended.  Bowel sounds are normal.   There is no abdominal tenderness.     Extremities: There is no dependent edema.  (Right leg in immobilizer)  Neurological: Patient is alert and oriented to person, place and time.    Skin:  Warm and dry.  No rash.               DISCHARGE DISPOSITION   Home or Self Care      DISCHARGE MEDICATIONS   Jenaro Haji Jr.   Home Medication Instructions SIXTO:[id number]    Printed on:01/19/18 9220   Medication Information                      aspirin 81 MG EC tablet  Take 81 mg by mouth Daily.             atorvastatin (LIPITOR) 40 MG tablet  Take 40 mg by mouth Daily.             cyclobenzaprine (FLEXERIL) 10 MG tablet               docusate sodium 100 MG capsule  Take 100 mg by mouth 2 (Two) Times a Day.             glipiZIDE (GLUCOTROL) 10 MG tablet  TAKE 1 TABLET TWICE DAILY             HYDROcodone-acetaminophen (NORCO) 7.5-325 MG per tablet  Take 1 tablet by mouth Every 4 (Four) Hours As Needed for Moderate Pain  for up to 10 days.             metFORMIN (GLUCOPHAGE) 1000 MG tablet  TAKE 1 TABLET TWICE DAILY WITH MEALS             Multiple Vitamins-Minerals (CENTRUM SILVER 50+MEN) tablet  Take  by mouth.             RELION CONFIRM/MICRO TEST test strip  CHECK BLOOD SUGAR ONCE DAILY             Thyroid (NP THYROID) 30 MG PO tablet               valsartan-hydrochlorothiazide (DIOVAN-HCT) 320-25 MG per tablet  Take 1 tablet by mouth Daily.             vitamin B-6 (PYRIDOXINE) 50 MG tablet  Take 100 mg by mouth Daily.                No future appointments.  Follow-up Information     Follow up with Suyapa Fitzpatrick MD .    Specialty:  Internal Medicine    Contact information:    [address]  [place] [id number] [phone]          Follow up with Daphne Rose MD .    Specialty:  Dermatology    Contact information:    [address]  [place] [id number] [phone]            TEST  RESULTS PENDING AT DISCHARGE         Rakesh Hurtado MD  [facility]  01/19/18  1:42 PM      Time: greater than 30 minutes.      Dragon disclaimer:  Much of this encounter note is an electronic transcription/translation of spoken language to printed text. The electronic translation of spoken language may permit erroneous, or at times, nonsensical words or phrases to be inadvertently transcribed; Although I have reviewed the note for such errors, some may still exist.

## 2018-01-19 NOTE — DISCHARGE PLACEMENT REQUEST
"Sully Haji  (73 y.o. Male)     Date of Birth Social Security Number Address Home Phone MRN    1945  [address]  [facility] [address] [phone] [id number]    [employer] Mar[employer] Status          Jew        Admission Date Admission Type Admitting Provider Attending Provider Department, Room/Bed    1/17/18 Emergency Brett Alston MD Schmitt, Christopher E, MD [address], 77/1    Discharge Date Discharge Disposition Discharge Destination         Home or Self Care             Attending Provider: Rakesh Hurtado MD     Allergies:  Contrast Dye    Isolation:  None   Infection:  None   Code Status:  FULL    Ht:  170.2 cm (67\")   Wt:  81.6 kg (180 lb)    Admission Cmt:  None   Principal Problem:  Rupture of right quadriceps tendon [S76.111A]                 Active Insurance as of 1/17/2018     Primary Coverage     Payor Plan Insurance Group Employer/Plan Group    MEDICARE MEDICARE A & B      Payor Plan Address Payor Plan Phone Number Effective From Effective To    [address] [phone] 1/1/2009     [place] [address]       Subscriber Name Subscriber Birth Date Member ID       SULLY HAJI [id number] 1945 [id number]           Secondary Coverage     Payor Plan Insurance Group Employer/Plan Group    [facility] SUPP [id number]     Payor Plan Address Payor Plan Phone Number Effective From Effective To    [address]  12/1/2016      [phone]       Subscriber Name Subscriber Birth Date Member ID       SULLY HAJI  1945 [id number]                 Emergency Contacts      (Rel.) Home Phone Work Phone Mobile Phone    Shadia Negro (Daughter) [phone] -- --    Deirdre Haji (Spouse) -- -- [phone]              "

## 2018-01-19 NOTE — PLAN OF CARE
Problem: Patient Care Overview (Adult)  Goal: Plan of Care Review  Outcome: Ongoing (interventions implemented as appropriate)   01/19/18 0239 01/19/18 1409 01/19/18 1605   Coping/Psychosocial Response Interventions   Plan Of Care Reviewed With --  patient;spouse --    Patient Care Overview   Progress progress toward functional goals as expected --  --    Outcome Evaluation   Outcome Summary/Follow up Plan --  --  pt able to work with therapy. ambulates well with assist x1. vss. nvi, pain is controlled with PO Pain medication. family at bedside. pt is pwb to right leg. KI in place. drsg changed c/d/i. pt able to demonstrate use of IS. discussed with pt the importance of blood glucose monitoring and meds as directed for a h/o DM pt to be d/c'd home with hh today.      Goal: Adult Individualization and Mutuality  Outcome: Ongoing (interventions implemented as appropriate)    Goal: Discharge Needs Assessment  Outcome: Ongoing (interventions implemented as appropriate)      Problem: Fall Risk (Adult)  Goal: Identify Related Risk Factors and Signs and Symptoms  Outcome: Ongoing (interventions implemented as appropriate)    Goal: Absence of Falls  Outcome: Ongoing (interventions implemented as appropriate)      Problem: Mobility, Physical Impaired (Adult)  Goal: Identify Related Risk Factors and Signs and Symptoms  Outcome: Ongoing (interventions implemented as appropriate)    Goal: Enhanced Mobility Skills  Outcome: Ongoing (interventions implemented as appropriate)    Goal: Enhanced Functionality Ability  Outcome: Ongoing (interventions implemented as appropriate)      Problem: Pain, Acute (Adult)  Goal: Identify Related Risk Factors and Signs and Symptoms  Outcome: Ongoing (interventions implemented as appropriate)    Goal: Acceptable Pain Control/Comfort Level  Outcome: Ongoing (interventions implemented as appropriate)      Problem: Perioperative Period (Adult)  Goal: Signs and Symptoms of Listed Potential Problems  Will be Absent or Manageable (Perioperative Period)  Outcome: Ongoing (interventions implemented as appropriate)

## 2018-01-19 NOTE — PROGRESS NOTES
Discharge Planning Assessment  [facility]     Patient Name: Jenaro Haji Jr.  MRN: [id number]  Today's Date: 1/19/2018    Admit Date: 1/17/2018          Discharge Needs Assessment       01/19/18 1102    Living Environment    Lives With spouse    Living Arrangements house    Home Accessibility stairs to enter home;stairs within home    Number of Stairs to Enter Home 2    Number of Stairs Within Home --   states there are stairs to the basement family room but he will not be going downstairs when he gets home    Stair Railings at Home none    Type of Financial/Environmental Concern none    Transportation Available car;family or friend will provide    Living Environment    Provides Primary Care For no one, unable/limited ability to care for self    Primary Care Provided By spouse/significant other    Quality Of Family Relationships supportive    Able to Return to Prior Living Arrangements yes    Discharge Needs Assessment    Concerns To Be Addressed discharge planning concerns    Readmission Within The Last 30 Days no previous admission in last 30 days    Outpatient/Agency/Support Group Needs other (see comments)    Community Agency Name(S) used OP PT with [facility] in [facility] in the past    Anticipated Changes Related to Illness inability to care for self    Equipment Currently Used at Home walker, standard;walker, rolling;bath bench;raised toilet;crutches;glucometer    Equipment Needed After Discharge none    Discharge Facility/Level Of Care Needs --   denies need for HH or SNF at [facility]    Current Discharge Risk physical impairment    Discharge Disposition still a patient            Discharge Plan       01/19/18 1104    Case Management/Social Work Plan    Plan Home via private auto with family assist and no further needs    Patient/Family In Agreement With Plan yes   spoke with pt and with pt permission his spouse Nish at bedside    Additional Comments Introduced self/explained role of CCP. Face sheet  data confirmed. IMM letter checked. Pt confirmed PCP and pharmacy. States he lives at home with his spouse and was IADLs prior to surgery. Denies previous use of HH or SNF. States he used [facility] in the past who is out of [place], [place] and plans to use them again outpatient when he is able to start PT in several weeks. [facility] he has all necessary equipment at home for use upon discharge. [facility] he plans to go home with his spouse to assist at this time and has no immediate needs for DC. CCP to follow............JW        Discharge Placement     No information found                Demographic Summary       01/19/18 1057    Referral Information    Admission Type inpatient    Arrived From admitted as an inpatient;home or self-care    Referral Source admission list    Reason For Consult discharge planning    Record Reviewed medical record    Contact Information    Permission Granted to Share Information With ;family/designee            Functional Status       01/19/18 1058    Functional Status Current    Ambulation 3-->assistive equipment and person    Transferring 3-->assistive equipment and person    Toileting 3-->assistive equipment and person    Bathing 2-->assistive person    Dressing 2-->assistive person    Eating 0-->independent    Communication 0-->understands/communicates without difficulty    Swallowing (if score 2 or more for any item, consult Rehab Services) 0-->swallows foods/liquids without difficulty    Current Functional Level Comment general post op weakness--encouraged pt to call for staff assist for needs    Change in Functional Status Since Onset of Current Illness/Injury yes    Functional Status Prior    Ambulation 0-->independent    Transferring 0-->independent    Toileting 0-->independent    Bathing 0-->independent    Dressing 0-->independent    Eating 0-->independent    Communication 0-->understands/communicates without difficulty    Swallowing 0-->swallows foods/liquids  without difficulty    IADL    Medications independent    Meal Preparation independent    Housekeeping independent    Laundry independent    Shopping independent    Oral Care independent    Activity Tolerance    Current Activity Limitations non-weight bearing, lower extremity right   per pt report    Usual Activity Tolerance good    Current Activity Tolerance poor    Cognitive/Perceptual/Developmental    Current Mental Status/Cognitive Functioning no deficits noted            Psychosocial     None            Abuse/Neglect     None            Legal     None            Substance Abuse     None            Patient Forms     None          Evelyn Diaz, RN

## 2018-01-19 NOTE — PROGRESS NOTES
"  Name: Jenaro Haji Jr.  ADMIT: 2018   Age/Sex: 73 y.o.male LOS:  LOS: 2 days    :    1945     ROOM: [facility]   MRN:    [id number]    PCP:    Suyapa Fitzpatrick MD     Subjective   Doing ok. Still with moderate amount of pain. Tolerating po.     Objective   Vital Signs  Temp:  [97.1 °F (36.2 °C)-99.8 °F (37.7 °C)] 97.6 °F (36.4 °C)  Heart Rate:  [] 87  Resp:  [14-18] 14  BP: (107-140)/(67-91) 129/73  Body mass index is 28.19 kg/(m^2).    Objective:  General Appearance:  Comfortable and well-appearing.    Vital signs: (most recent): Blood pressure 129/73, pulse 87, temperature 97.6 °F (36.4 °C), temperature source Oral, resp. rate 14, height 170.2 cm (67\"), weight 81.6 kg (180 lb), SpO2 96 %.  Vital signs are normal.    HEENT: Normal HEENT exam.    Lungs:  Normal effort.  Breath sounds clear to auscultation.    Heart: Normal rate.  Regular rhythm.    Abdomen: Abdomen is soft and non-distended.  Bowel sounds are normal.   There is no abdominal tenderness.     Extremities: There is no deformity or dependent edema.    Neurological: Patient is alert and oriented to person, place and time.    Skin:  Warm and dry.  No rash.             Results Review:       I reviewed the patient's new clinical results.    Results from last 7 days  Lab Units 18  04218   WBC 10*3/mm3 10.92* 6.72 8.66   HEMOGLOBIN g/dL 10.8* 11.7* 12.5*   PLATELETS 10*3/mm3 198 183 179       Results from last 7 days  Lab Units 18  04218   SODIUM mmol/L 134* 135* 139   POTASSIUM mmol/L 3.8 4.0 3.7   CHLORIDE mmol/L 95* 97* 97*   CO2 mmol/L 26.7 29.6* 29.9*   BUN mg/dL 11 14 15   CREATININE mg/dL 0.77 0.85 0.72*   GLUCOSE mg/dL 175* 144* 95   Estimated Creatinine Clearance: 82.8 mL/min (by C-G formula based on Cr of 0.77).    Results from last 7 days  Lab Units 18  0429 18   CALCIUM mg/dL 7.8* 8.6 9.0   ALBUMIN g/dL  --   --  3.90 "       RADIOLOGY  1/19/2018  Pending      atorvastatin 40 mg Oral Daily   glipiZIDE 10 mg Oral BID AC   valsartan 160 mg Oral Q24H   And      hydrochlorothiazide 12.5 mg Oral Q24H   insulin aspart 0-7 Units Subcutaneous 4x Daily With Meals & Nightly   metFORMIN 1,000 mg Oral BID With Meals   Thyroid 30 mg Oral QAM AC   vitamin B-6 100 mg Oral Daily      Diet Regular; Consistent Carbohydrate      Assessment/Plan   Assessment:   Principal Problem:    Rupture of right quadriceps tendon  Active Problems:    Hypertension    Hyperlipidemia    Non-insulin dependent type 2 diabetes mellitus        Plan:   - Cont home diabetic meds plus SSI. Place on CC diet  - cont home HTN meds  - pain control  - d/c IVF  - d/c whenever ok with ortho      Disposition  TBD.      Rakesh Hurtado MD  [facility]  01/19/18  8:02 AM

## 2018-06-07 ENCOUNTER — OFFICE VISIT (OUTPATIENT)
Dept: ORTHOPEDIC SURGERY | Facility: CLINIC | Age: 73
End: 2018-06-07

## 2018-06-07 VITALS — TEMPERATURE: 98 F | BODY MASS INDEX: 28.25 KG/M2 | HEIGHT: 67 IN | WEIGHT: 180 LBS

## 2018-06-07 DIAGNOSIS — M25.561 CHRONIC PAIN OF BOTH KNEES: Primary | ICD-10-CM

## 2018-06-07 DIAGNOSIS — M25.869 PATELLOFEMORAL DYSFUNCTION, UNSPECIFIED LATERALITY: ICD-10-CM

## 2018-06-07 DIAGNOSIS — M25.562 CHRONIC PAIN OF BOTH KNEES: Primary | ICD-10-CM

## 2018-06-07 DIAGNOSIS — G89.29 CHRONIC PAIN OF BOTH KNEES: Primary | ICD-10-CM

## 2018-06-07 PROCEDURE — 73562 X-RAY EXAM OF KNEE 3: CPT | Performed by: ORTHOPAEDIC SURGERY

## 2018-06-07 PROCEDURE — 99214 OFFICE O/P EST MOD 30 MIN: CPT | Performed by: ORTHOPAEDIC SURGERY

## 2018-06-07 PROCEDURE — 20610 DRAIN/INJ JOINT/BURSA W/O US: CPT | Performed by: ORTHOPAEDIC SURGERY

## 2018-06-07 RX ORDER — LIDOCAINE HYDROCHLORIDE 10 MG/ML
4 INJECTION, SOLUTION EPIDURAL; INFILTRATION; INTRACAUDAL; PERINEURAL
Status: COMPLETED | OUTPATIENT
Start: 2018-06-07 | End: 2018-06-07

## 2018-06-07 RX ORDER — LEVOTHYROXINE SODIUM 0.07 MG/1
75 TABLET ORAL DAILY
COMMUNITY
Start: 2018-04-03

## 2018-06-07 RX ORDER — METHYLPREDNISOLONE ACETATE 80 MG/ML
80 INJECTION, SUSPENSION INTRA-ARTICULAR; INTRALESIONAL; INTRAMUSCULAR; SOFT TISSUE
Status: COMPLETED | OUTPATIENT
Start: 2018-06-07 | End: 2018-06-07

## 2018-06-07 RX ORDER — LIOTHYRONINE SODIUM 5 UG/1
5 TABLET ORAL DAILY
COMMUNITY
Start: 2018-05-10

## 2018-06-07 RX ADMIN — METHYLPREDNISOLONE ACETATE 80 MG: 80 INJECTION, SUSPENSION INTRA-ARTICULAR; INTRALESIONAL; INTRAMUSCULAR; SOFT TISSUE at 09:37

## 2018-06-07 RX ADMIN — LIDOCAINE HYDROCHLORIDE 4 ML: 10 INJECTION, SOLUTION EPIDURAL; INFILTRATION; INTRACAUDAL; PERINEURAL at 09:37

## 2018-06-07 NOTE — PROGRESS NOTES
New bilateral Knee      Patient: Jenaro Haji Jr.        YOB: 1945    Medical Record Number: [id number]        Chief Complaints: bilateral knee pain  Chief Complaint   Patient presents with   • Left Knee - Pain, Establish Care   • Right Knee - Pain, Establish Care           History of Present Illness: This is a  73 y.o. male who presents complaining of bilateral knee pain he fell in January sustaining a right quadriceps tendon tear was fixed by Dr. BELKIS Tristan he states he has his motion back but still is limited on strength especially going downstairs.  He is still working with physical therapy.  States the left knee also hurts him going down stairs and feels like both are a little bit weak.  Symptoms are moderate intermittent aching he is tried anti-inflammatories physical therapy symptoms are worse with activity particularly going down stairs she is retired his past medical history marked for skin cancer diabetes thyroid disease      Allergies:   Allergies   Allergen Reactions   • Contrast Dye        Medications:   Home Medications:  Current Outpatient Prescriptions on File Prior to Visit   Medication Sig   • aspirin 81 MG EC tablet Take 81 mg by mouth Daily.   • atorvastatin (LIPITOR) 40 MG tablet Take 40 mg by mouth Daily.   • glipiZIDE (GLUCOTROL) 10 MG tablet TAKE 1 TABLET TWICE DAILY   • metFORMIN (GLUCOPHAGE) 1000 MG tablet TAKE 1 TABLET TWICE DAILY WITH MEALS   • Multiple Vitamins-Minerals (CENTRUM SILVER 50+MEN) tablet Take  by mouth.   • RELION CONFIRM/MICRO TEST test strip CHECK BLOOD SUGAR ONCE DAILY   • valsartan-hydrochlorothiazide (DIOVAN-HCT) 320-25 MG per tablet Take 1 tablet by mouth Daily.   • vitamin B-6 (PYRIDOXINE) 50 MG tablet Take 100 mg by mouth Daily.   • [DISCONTINUED] cyclobenzaprine (FLEXERIL) 10 MG tablet    • [DISCONTINUED] docusate sodium 100 MG capsule Take 100 mg by mouth 2 (Two) Times a Day.   • [DISCONTINUED] Thyroid (NP THYROID) 30 MG PO tablet      No current  facility-administered medications on file prior to visit.      Current Medications:  Scheduled Meds:  Continuous Infusions:  No current facility-administered medications for this visit.   PRN Meds:.    Past Medical History:   Diagnosis Date   • Chronic kidney disease    • Diabetes mellitus    • Hyperlipidemia    • Hypertension    • Kidney stone    • Lumbar spinal stenosis    • Nocturia    • Non-insulin dependent type 2 diabetes mellitus    • Pain in both wrists    • Thyroid disease         Past Surgical History:   Procedure Laterality Date   • COLONOSCOPY  03/18/2011   • COLONOSCOPY N/A 11/17/2017    Procedure: COLONOSCOPY TO CECUM/TI WITH POLYPECTOMY ( COLD BX);  Surgeon: Kenan Galindo MD;  Location: [facility] ENDOSCOPY;  Service:    • HERNIA REPAIR     • INGUINAL HERNIA REPAIR     • QUADRICEPS TENDON REPAIR Right 1/18/2018    Procedure: QUADRICEPS TENDON REPAIR;  Surgeon: Brett Mclaughlin MD;  Location: [facility] MAIN OR;  Service:         Social History     Occupational History   • Not on file.     Social History Main Topics   • Smoking status: Former Smoker   • Smokeless tobacco: Never Used   • Alcohol use Yes      Comment: weekly - moderate   • Drug use: No   • Sexual activity: Defer      Social History     Social History Narrative   • No narrative on file        Family History   Problem Relation Age of Onset   • Cancer Mother    • Diabetes Mother    • Aortic aneurysm Father         Abdominal   • Coronary artery disease Father    • Heart attack Father    • Hypertension Father    • Sudden death Father    • Stroke Paternal Uncle              Review of Systems: 14 point review of systems are remarkable for the pertinent positives listed in the chart by the patient the remainder are negative    Review of Systems      Physical Exam: 73 y.o. male  General Appearance:    Alert, cooperative, in no acute distress                   Vitals:    06/07/18 0901   Temp: 98 °F (36.7 °C)   Weight: 81.6 kg (180 lb)  "  Height: 170.2 cm (67\")      Patient is alert and read ×3 no acute distress appears her above-listed at height weight and age.  Affect is normal respiratory rate is normal unlabored. Heart rate regular rate rhythm, sclera, dentition and hearing are normal for the purpose of this exam.        Ortho Exam  Physical exam of the right  knee reveals no effusion no redness.  He has a healed surgical incision anteriorly he can extend against gravity completely The patient does have tenderness about the medial l joint line.  No tenderness about the lateral l joint line.  A negative bounce home and a positive l medial Torito.    Patient has a stable ligamentous exam.  The patient has a negative Lachman and negative anterior drawer and a negative pivot shift.  Quads are reasonable and symmetric bilaterally.  Calf is soft and nontender.  There is no overlying skin changes no lymphedema lymphadenopathy.  Patient has good hip range of motion full symmetric and asymptomatic and a normal ankle exam.  She has good distal pulses and sensation distally is intact    Physical exam of the left knee reveals no effusion, no erythema.  The patient has no palpable tenderness along the medial joint line, no tenderness about the lateral joint line.  Patient does have crepitus with patellofemoral range of motion.  They also have subjective symptoms anteriorly during exam.  The patient has a negative bounce home, negative Torito and a stable ligamentous exam.  Quad tone is reasonable and symmetric.  There are no overlying skin changes no lymphedema no lymphadenopathy.  There is good hip range of motion which is full symmetric and asymptomatic and a normal ankle exam.  Hamstrings and IT band are tight bilaterally.    Large Joint Arthrocentesis  Date/Time: 6/7/2018 9:37 AM  Consent given by: patient  Site marked: site marked  Timeout: Immediately prior to procedure a time out was called to verify the correct patient, procedure, equipment, " support staff and site/side marked as required   Supporting Documentation  Indications: pain   Procedure Details  Location: knee - R knee  Needle size: 25 G  Approach: anteromedial  Medications administered: 80 mg methylPREDNISolone acetate 80 MG/ML; 4 mL lidocaine PF 1% 1 %  Patient tolerance: patient tolerated the procedure well with no immediate complications    Large Joint Arthrocentesis  Date/Time: 6/7/2018 9:37 AM  Consent given by: patient  Site marked: site marked  Timeout: Immediately prior to procedure a time out was called to verify the correct patient, procedure, equipment, support staff and site/side marked as required   Supporting Documentation  Indications: pain   Procedure Details  Location: knee - L knee  Needle size: 25 G  Approach: anteromedial  Medications administered: 80 mg methylPREDNISolone acetate 80 MG/ML; 4 mL lidocaine PF 1% 1 %  Patient tolerance: patient tolerated the procedure well with no immediate complications                   Radiology:   AP, Lateral and merchant views of the right and left knee  were ordered/reviewed to evauateknee pain.  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 X-ray performed in the DR room.          Assessment/Plan:    Bilateral knee pain status post right quadriceps tendon repair.  I think he got an excellent quadriceps tendon repair I think his current function is pretty normal for what he had done he is still having some pain going downstairs I suspect this is still related to some residual weakness and patellofemoral issues he could have meniscal pathology that needs to remain in the differential.  I  think and injections a great diagnostic and therapeutic tool perhaps when getting over this symptomatic pop and he can get stronger with physical therapy if he fails to improve we'll pursue an MRI

## 2019-10-17 ENCOUNTER — TRANSCRIBE ORDERS (OUTPATIENT)
Dept: ADMINISTRATIVE | Facility: HOSPITAL | Age: 74
End: 2019-10-17

## 2019-10-17 DIAGNOSIS — Z13.6 ENCOUNTER FOR SCREENING FOR VASCULAR DISEASE: Primary | ICD-10-CM

## 2019-10-31 ENCOUNTER — HOSPITAL ENCOUNTER (OUTPATIENT)
Dept: CARDIOLOGY | Facility: HOSPITAL | Age: 74
Discharge: HOME | End: 2019-10-31
Admitting: SURGERY

## 2019-10-31 VITALS
DIASTOLIC BLOOD PRESSURE: 79 MMHG | HEART RATE: 75 BPM | HEIGHT: 66 IN | SYSTOLIC BLOOD PRESSURE: 129 MMHG | BODY MASS INDEX: 29.09 KG/M2 | WEIGHT: 181 LBS

## 2019-10-31 DIAGNOSIS — Z13.6 ENCOUNTER FOR SCREENING FOR VASCULAR DISEASE: ICD-10-CM

## 2019-10-31 LAB
BH CV ECHO MEAS - DIST AO DIAM: 1.45 CM
BH CV VAS BP LEFT ARM: NORMAL MMHG
BH CV VAS BP RIGHT ARM: NORMAL MMHG
BH CV XLRA MEAS - MID AO DIAM: 1.63 CM
BH CV XLRA MEAS - PAD LEFT ABI DP: 1.14
BH CV XLRA MEAS - PAD LEFT ABI PT: 1.11
BH CV XLRA MEAS - PAD LEFT ARM: 129 MMHG
BH CV XLRA MEAS - PAD LEFT LEG DP: 148 MMHG
BH CV XLRA MEAS - PAD LEFT LEG PT: 144 MMHG
BH CV XLRA MEAS - PAD RIGHT ABI DP: 1.13
BH CV XLRA MEAS - PAD RIGHT ABI PT: 1.16
BH CV XLRA MEAS - PAD RIGHT ARM: 124 MMHG
BH CV XLRA MEAS - PAD RIGHT LEG DP: 146 MMHG
BH CV XLRA MEAS - PAD RIGHT LEG PT: 150 MMHG
BH CV XLRA MEAS - PROX AO DIAM: 2.16 CM
BH CV XLRA MEAS LEFT ICA/CCA RATIO: 0.94
BH CV XLRA MEAS LEFT MID CCA PSV: NORMAL CM/SEC
BH CV XLRA MEAS LEFT MID ICA PSV: NORMAL CM/SEC
BH CV XLRA MEAS LEFT PROX ECA PSV: NORMAL CM/SEC
BH CV XLRA MEAS RIGHT ICA/CCA RATIO: 0.74
BH CV XLRA MEAS RIGHT MID CCA PSV: NORMAL CM/SEC
BH CV XLRA MEAS RIGHT MID ICA PSV: NORMAL CM/SEC
BH CV XLRA MEAS RIGHT PROX ECA PSV: NORMAL CM/SEC

## 2019-10-31 PROCEDURE — 93799 UNLISTED CV SVC/PROCEDURE: CPT

## 2019-12-04 ENCOUNTER — TRANSCRIBE ORDERS (OUTPATIENT)
Dept: ADMINISTRATIVE | Facility: HOSPITAL | Age: 74
End: 2019-12-04

## 2019-12-04 DIAGNOSIS — M79.89 LEFT LEG SWELLING: Primary | ICD-10-CM

## 2019-12-05 ENCOUNTER — HOSPITAL ENCOUNTER (OUTPATIENT)
Dept: CARDIOLOGY | Facility: HOSPITAL | Age: 74
Discharge: HOME | End: 2019-12-05
Admitting: INTERNAL MEDICINE

## 2019-12-05 DIAGNOSIS — M79.89 LEFT LEG SWELLING: ICD-10-CM

## 2019-12-05 LAB
BH CV LOW VAS LEFT POPLITEAL SPONT: 1
BH CV LOWER VASCULAR LEFT COMMON FEMORAL AUGMENT: NORMAL
BH CV LOWER VASCULAR LEFT COMMON FEMORAL COMPETENT: NORMAL
BH CV LOWER VASCULAR LEFT COMMON FEMORAL COMPRESS: NORMAL
BH CV LOWER VASCULAR LEFT COMMON FEMORAL PHASIC: NORMAL
BH CV LOWER VASCULAR LEFT COMMON FEMORAL SPONT: NORMAL
BH CV LOWER VASCULAR LEFT DISTAL FEMORAL COMPRESS: NORMAL
BH CV LOWER VASCULAR LEFT GASTRONEMIUS COMPRESS: NORMAL
BH CV LOWER VASCULAR LEFT GREATER SAPH AK COMPRESS: NORMAL
BH CV LOWER VASCULAR LEFT GREATER SAPH BK COMPRESS: NORMAL
BH CV LOWER VASCULAR LEFT MID FEMORAL AUGMENT: NORMAL
BH CV LOWER VASCULAR LEFT MID FEMORAL COMPETENT: NORMAL
BH CV LOWER VASCULAR LEFT MID FEMORAL COMPRESS: NORMAL
BH CV LOWER VASCULAR LEFT MID FEMORAL PHASIC: NORMAL
BH CV LOWER VASCULAR LEFT MID FEMORAL SPONT: NORMAL
BH CV LOWER VASCULAR LEFT PERONEAL COMPRESS: NORMAL
BH CV LOWER VASCULAR LEFT POPLITEAL AUGMENT: NORMAL
BH CV LOWER VASCULAR LEFT POPLITEAL COMPETENT: NORMAL
BH CV LOWER VASCULAR LEFT POPLITEAL COMPRESS: NORMAL
BH CV LOWER VASCULAR LEFT POPLITEAL PHASIC: NORMAL
BH CV LOWER VASCULAR LEFT POPLITEAL SPONT: NORMAL
BH CV LOWER VASCULAR LEFT POSTERIOR TIBIAL COMPRESS: NORMAL
BH CV LOWER VASCULAR LEFT PROFUNDA FEMORAL COMPRESS: NORMAL
BH CV LOWER VASCULAR LEFT PROXIMAL FEMORAL COMPRESS: NORMAL
BH CV LOWER VASCULAR LEFT SAPHENOFEMORAL JUNCTION COMPRESS: NORMAL
BH CV LOWER VASCULAR RIGHT COMMON FEMORAL AUGMENT: NORMAL
BH CV LOWER VASCULAR RIGHT COMMON FEMORAL COMPETENT: NORMAL
BH CV LOWER VASCULAR RIGHT COMMON FEMORAL COMPRESS: NORMAL
BH CV LOWER VASCULAR RIGHT COMMON FEMORAL PHASIC: NORMAL
BH CV LOWER VASCULAR RIGHT COMMON FEMORAL SPONT: NORMAL
BH CV POP FLUID COLLECT LEFT: 1

## 2019-12-05 PROCEDURE — 93971 EXTREMITY STUDY: CPT

## 2019-12-11 NOTE — PROGRESS NOTES
Left Knee Follow Up      Patient: Jenaro Haji Jr.        YOB: 1945            Chief Complaints: Left knee pain      History of Present Illness:      Physical Exam: 74 y.o. male  General Appearance:    Alert, cooperative, in no acute distress                 There were no vitals filed for this visit.     Patient is alert and read ×3 no acute distress appears her above-listed at height weight and age.  Affect is normal respiratory rate is normal unlabored. Heart rate regular rate rhythm, sclera, dentition and hearing are normal for the purpose of this exam.      Ortho Exam                 Assessment/Plan:

## 2019-12-12 ENCOUNTER — OFFICE VISIT (OUTPATIENT)
Dept: ORTHOPEDIC SURGERY | Facility: CLINIC | Age: 74
End: 2019-12-12

## 2019-12-12 VITALS — WEIGHT: 180 LBS | BODY MASS INDEX: 28.25 KG/M2 | HEIGHT: 67 IN | TEMPERATURE: 97.8 F

## 2019-12-12 DIAGNOSIS — S83.242A TEAR OF MEDIAL MENISCUS OF LEFT KNEE, CURRENT, UNSPECIFIED TEAR TYPE, INITIAL ENCOUNTER: ICD-10-CM

## 2019-12-12 DIAGNOSIS — M25.562 LEFT KNEE PAIN, UNSPECIFIED CHRONICITY: Primary | ICD-10-CM

## 2019-12-12 PROCEDURE — 73562 X-RAY EXAM OF KNEE 3: CPT | Performed by: ORTHOPAEDIC SURGERY

## 2019-12-12 PROCEDURE — 99214 OFFICE O/P EST MOD 30 MIN: CPT | Performed by: ORTHOPAEDIC SURGERY

## 2019-12-12 NOTE — PROGRESS NOTES
New Left Knee      Patient: Jenaro Haji Jr.        YOB: 1945    Medical Record Number: [id number]        Chief Complaints: left knee pain      History of Present Illness: This is a this is a 74-year-old male who presents complaining of left knee pain that began about 5 to 6 weeks ago said some knee pain the past but nothing like this he states it took his breath away he had swelling night pain inability to fully extend and some and really inability to bear weight.  His symptoms are moderate severe intermittent stabbing aching swelling worse with standing sitting walking better with ice he is retired this past medical history is remarkable for diabetes hypertension chronic kidney disease hyperlipidemia lumbar stenosis and diabetes        Allergies:   Allergies   Allergen Reactions   • Contrast Dye        Medications:   Home Medications:  Current Outpatient Medications on File Prior to Visit   Medication Sig   • aspirin 81 MG EC tablet Take 81 mg by mouth Daily.   • atorvastatin (LIPITOR) 40 MG tablet Take 40 mg by mouth Daily.   • glipiZIDE (GLUCOTROL) 10 MG tablet TAKE 1 TABLET TWICE DAILY   • levothyroxine (SYNTHROID, LEVOTHROID) 75 MCG tablet    • liothyronine (CYTOMEL) 5 MCG tablet    • metFORMIN (GLUCOPHAGE) 1000 MG tablet TAKE 1 TABLET TWICE DAILY WITH MEALS   • Multiple Vitamins-Minerals (CENTRUM SILVER 50+MEN) tablet Take  by mouth.   • RELION CONFIRM/MICRO TEST test strip CHECK BLOOD SUGAR ONCE DAILY   • valsartan-hydrochlorothiazide (DIOVAN-HCT) 320-25 MG per tablet Take 1 tablet by mouth Daily.   • vitamin B-6 (PYRIDOXINE) 50 MG tablet Take 100 mg by mouth Daily.     No current facility-administered medications on file prior to visit.      Current Medications:  Scheduled Meds:  Continuous Infusions:  No current facility-administered medications for this visit.   PRN Meds:.    Past Medical History:   Diagnosis Date   • Chronic kidney disease    • Diabetes mellitus (CMS/HCC)    •  Hyperlipidemia    • Hypertension    • Kidney stone    • Lumbar spinal stenosis    • Nocturia    • Non-insulin dependent type 2 diabetes mellitus (CMS/HCC)    • Pain in both wrists    • Thyroid disease         Past Surgical History:   Procedure Laterality Date   • COLONOSCOPY  03/18/2011   • COLONOSCOPY N/A 11/17/2017    Procedure: COLONOSCOPY TO CECUM/TI WITH POLYPECTOMY ( COLD BX);  Surgeon: Kenan Galindo MD;  Location: [facility] ENDOSCOPY;  Service:    • HERNIA REPAIR     • INGUINAL HERNIA REPAIR     • QUADRICEPS TENDON REPAIR Right 1/18/2018    Procedure: QUADRICEPS TENDON REPAIR;  Surgeon: Brett Mclaughlin MD;  Location: [facility] MAIN OR;  Service:         Social History     Occupational History   • Not on file   Tobacco Use   • Smoking status: Former Smoker   • Smokeless tobacco: Never Used   Substance and Sexual Activity   • Alcohol use: Yes     Comment: weekly - moderate   • Drug use: No   • Sexual activity: Defer    Social History     Social History Narrative   • Not on file        Family History   Problem Relation Age of Onset   • Cancer Mother    • Diabetes Mother    • Aortic aneurysm Father         Abdominal   • Coronary artery disease Father    • Heart attack Father    • Hypertension Father    • Sudden death Father    • Stroke Paternal Uncle              Review of Systems: 14 point review of systems are remarkable for the pertinent positives listed in the chart by the patient the remainder negative    Review of Systems      Physical Exam: 74 y.o. male  General Appearance:    Alert, cooperative, in no acute distress                 There were no vitals filed for this visit.   Patient is alert and read ×3 no acute distress appears her above-listed at height weight and age.  Affect is normal respiratory rate is normal unlabored. Heart rate regular rate rhythm, sclera, dentition and hearing are normal for the purpose of this exam.        Ortho Exam Physical exam of the left knee reveals no effusion  no redness.  The patient does have tenderness about the medial joint line.  No tenderness about the lateral joint line.  A negative bounce home and a positive medial Torito.  There is some pain medially  with a lateral Torito.  Patient has a stable ligamentous exam.  Quads are reasonable and symmetric bilaterally.  Calf is soft and nontender.  There is no overlying skin changes no lymphedema lymphadenopathy.  Patient has good hip range of motion full symmetric and asymptomatic and a normal ankle exam.    Procedures             Radiology:   AP, Lateral and merchant views of the left knee  were ordered/reviewed to evauateknee pain.  I have no comparative films he has very mild narrowing of his left medial compartment maybe 10% less than the opposite side he also has mild to moderate patellofemoral OA no acute pathology  Imaging Results (Most Recent)     Procedure Component Value Units Date/Time    XR Knee 3 View Left [[id number]] Resulted:  12/12/19 1230     Updated:  12/12/19 1255    Impression:       Ordering physician's impression is located in the Encounter Note dated 12/12/19. X-ray performed in the DR room.          Assessment/Plan: Left knee pain I think this is probably meniscal in origin he has episodes where he cannot fully extend cannot bear weight plan is to proceed with an MRI and have him follow-up after that test                                Answers for HPI/ROS submitted by the patient on 12/7/2019   Lower extremity pain  Incident occurred: more than 1 week ago  Incident location: at home  Injury mechanism: other  Pain location: left leg, left knee  Pain quality: aching, shooting  Pain - numeric: 5/10  Pain course: fluctuating  tingling: No  inability to bear weight: Yes  loss of motion: Yes  loss of sensation: No  muscle weakness: Yes  Foreign body present: no foreign bodies  Aggravated by: movement

## 2019-12-16 ENCOUNTER — HOSPITAL ENCOUNTER (OUTPATIENT)
Dept: MRI IMAGING | Facility: HOSPITAL | Age: 74
Discharge: HOME | End: 2019-12-16
Admitting: ORTHOPAEDIC SURGERY

## 2019-12-16 DIAGNOSIS — S83.242A TEAR OF MEDIAL MENISCUS OF LEFT KNEE, CURRENT, UNSPECIFIED TEAR TYPE, INITIAL ENCOUNTER: ICD-10-CM

## 2019-12-16 PROCEDURE — 73721 MRI JNT OF LWR EXTRE W/O DYE: CPT

## 2019-12-19 ENCOUNTER — OFFICE VISIT (OUTPATIENT)
Dept: ORTHOPEDIC SURGERY | Facility: CLINIC | Age: 74
End: 2019-12-19

## 2019-12-19 ENCOUNTER — HOSPITAL ENCOUNTER (OUTPATIENT)
Dept: CARDIOLOGY | Facility: HOSPITAL | Age: 74
Discharge: HOME | End: 2019-12-19
Admitting: ORTHOPAEDIC SURGERY

## 2019-12-19 VITALS — TEMPERATURE: 97.3 F | HEIGHT: 67 IN | WEIGHT: 178.8 LBS | BODY MASS INDEX: 28.06 KG/M2

## 2019-12-19 DIAGNOSIS — M79.89 PAIN AND SWELLING OF LEFT LOWER LEG: ICD-10-CM

## 2019-12-19 DIAGNOSIS — M19.90 ARTHRITIS: ICD-10-CM

## 2019-12-19 DIAGNOSIS — M79.662 PAIN AND SWELLING OF LEFT LOWER LEG: ICD-10-CM

## 2019-12-19 DIAGNOSIS — S83.232D COMPLEX TEAR OF MEDIAL MENISCUS OF LEFT KNEE AS CURRENT INJURY, SUBSEQUENT ENCOUNTER: ICD-10-CM

## 2019-12-19 DIAGNOSIS — M79.662 PAIN AND SWELLING OF LEFT LOWER LEG: Primary | ICD-10-CM

## 2019-12-19 DIAGNOSIS — M79.89 PAIN AND SWELLING OF LEFT LOWER LEG: Primary | ICD-10-CM

## 2019-12-19 PROCEDURE — 20610 DRAIN/INJ JOINT/BURSA W/O US: CPT | Performed by: ORTHOPAEDIC SURGERY

## 2019-12-19 PROCEDURE — 99213 OFFICE O/P EST LOW 20 MIN: CPT | Performed by: ORTHOPAEDIC SURGERY

## 2019-12-19 PROCEDURE — 93971 EXTREMITY STUDY: CPT

## 2019-12-19 RX ORDER — METHYLPREDNISOLONE ACETATE 80 MG/ML
80 INJECTION, SUSPENSION INTRA-ARTICULAR; INTRALESIONAL; INTRAMUSCULAR; SOFT TISSUE
Status: COMPLETED | OUTPATIENT
Start: 2019-12-19 | End: 2019-12-19

## 2019-12-19 RX ORDER — LOSARTAN POTASSIUM AND HYDROCHLOROTHIAZIDE 25; 100 MG/1; MG/1
1 TABLET ORAL DAILY
COMMUNITY
Start: 2018-01-01

## 2019-12-19 RX ADMIN — METHYLPREDNISOLONE ACETATE 80 MG: 80 INJECTION, SUSPENSION INTRA-ARTICULAR; INTRALESIONAL; INTRAMUSCULAR; SOFT TISSUE at 08:52

## 2019-12-19 NOTE — PROGRESS NOTES
Left Knee MRI Follow Up      Patient: Jenaro Haji Jr.        YOB: 1945            Chief Complaints:  left Knee pain      History of Present Illness: The patient is here follow-up of an MRI of the knee MRI demonstrates a complex tear of the medial meniscus but also grade 4 changes with complete cartilage loss on some areas of the medial femoral condyle.  I discussed with him that I am not sure arthroscopy would be of benefit to him at like to try an injection for she does have some swelling down in the leg and a little bit of pitting edema down into his medial tibial area      Physical Exam: 74 y.o. male  General Appearance:    Alert, cooperative, in no acute distress                 There were no vitals filed for this visit.     Patient is alert and read ×3 no acute distress appears her above-listed at height weight and age.  Affect is normal respiratory rate is normal unlabored. Heart rate regular rate rhythm, sclera, dentition and hearing are normal for the purpose of this exam.      Ortho Exam Physical exam of the left knee reveals no effusion, no erythema.  It mild loss of extension and full flexion  Patient has mild varus alignment.  They have mild tenderness to palpation about the medial compartment, no tenderness laterally..  The patient has a negative bounce home, negative Torito and a stable ligamentous exam.  Quad tone is reasonable and symmetric.  There are no overlying skin changes no lymphedema no lymphadenopathy.  There is good hip range of motion which is full symmetric and asymptomatic and a normal ankle exam.  He does have some pitting edema down to the medial tibia          MRI Results: Is as above I have reviewed the films myself and agree with findings also reviewed x-rays x-rays are deceiving and that he is still has good maintenance of his joint space    Large Joint Arthrocentesis: L knee  Date/Time: 12/19/2019 8:52 AM  Consent given by: patient  Site marked: site  marked  Timeout: Immediately prior to procedure a time out was called to verify the correct patient, procedure, equipment, support staff and site/side marked as required   Supporting Documentation  Indications: pain   Procedure Details  Location: knee - L knee  Preparation: Patient was prepped and draped in the usual sterile fashion  Needle size: 22 G  Approach: anteromedial  Medications administered: 4 mL lidocaine (cardiac); 80 mg methylPREDNISolone acetate 80 MG/ML              Assessment/Plan: Left knee medial meniscus tear and degenerative changes I do not think he benefit at this time from arthroscopy I think he has too much arthritis we will try an injection but be concerned about his edema in the leg I will get a Doppler to rule out a DVT

## 2020-01-23 ENCOUNTER — OFFICE VISIT (OUTPATIENT)
Dept: ORTHOPEDIC SURGERY | Facility: CLINIC | Age: 75
End: 2020-01-23

## 2020-01-23 VITALS — BODY MASS INDEX: 28.27 KG/M2 | WEIGHT: 180.1 LBS | TEMPERATURE: 98.3 F | HEIGHT: 67 IN

## 2020-01-23 DIAGNOSIS — M17.12 PRIMARY LOCALIZED OSTEOARTHROSIS OF LEFT LOWER LEG: Primary | ICD-10-CM

## 2020-01-23 PROCEDURE — 99213 OFFICE O/P EST LOW 20 MIN: CPT | Performed by: ORTHOPAEDIC SURGERY

## 2020-01-23 NOTE — PROGRESS NOTES
Left Knee Follow Up      Patient: Jenaro Haji Jr.        YOB: 1945            Chief Complaints: Left knee pain      History of Present Illness: Patient is here follow-up of left knee pain he has an MRI which shows a large medial meniscus tear as well as grade 4 changes on the medial femoral condyle.  We have tried to treat this conservatively.  The injection did just start helping him and I think he is a bit encouraged.  He still has some swelling and is wondering what that is from      Physical Exam: 75 y.o. male  General Appearance:    Alert, cooperative, in no acute distress                 There were no vitals filed for this visit.     Patient is alert and read ×3 no acute distress appears her above-listed at height weight and age.  Affect is normal respiratory rate is normal unlabored. Heart rate regular rate rhythm, sclera, dentition and hearing are normal for the purpose of this exam.      Ortho Exam       Physical exam of the left knee reveals no effusion, no erythema.  It mild loss of extension and full flexion  Patient has mild varus alignment.  They have mild tenderness to palpation about the medial compartment, no tenderness laterally..  The patient has a negative bounce home, negative Torito and a stable ligamentous exam.  Quad tone is reasonable and symmetric.  There are no overlying skin changes no lymphedema no lymphadenopathy.  There is good hip range of motion which is full symmetric and asymptomatic and a normal ankle exam.            Assessment/Plan: Left knee pain I think the swelling is from both the meniscus and the arthritis.  I do not think arthroscopy would be of great benefit for him.  I think another injection is reasonable and needs to be 3 months from the last.  We did also talk about Visco supplementation and also talked about PRP stem cell injection he had multiple questions which I think I answered to his satisfaction.  Also talked about possibility of knee  replacement the future I did review his x-rays with him MRI reports etc. and reviewed all those today

## 2020-03-18 NOTE — PROGRESS NOTES
Left Knee Follow Up      Patient: Jenaro Haji Jr.        YOB: 1945            Chief Complaints: left knee pain      History of Present Illness: Patient is here follow left knee pain he has known degenerative changes I did have an MRI in December which show the significance of his degenerative changes which is full-thickness chondral loss      Physical Exam: 75 y.o. male  General Appearance:    Alert, cooperative, in no acute distress                 There were no vitals filed for this visit.     Patient is alert and read ×3 no acute distress appears her above-listed at height weight and age.  Affect is normal respiratory rate is normal unlabored. Heart rate regular rate rhythm, sclera, dentition and hearing are normal for the purpose of this exam.      Ortho Exam     Physical exam of the left knee reveals no effusion, no erythema.  It mild loss of extension and full flexion  Patient has mild varus alignment.  They have mild tenderness to palpation about the medial compartment, no tenderness laterally..  The patient has a negative bounce home, negative Torito and a stable ligamentous exam.  Quad tone is reasonable and symmetric.  There are no overlying skin changes no lymphedema no lymphadenopathy.  There is good hip range of motion which is full symmetric and asymptomatic and a normal ankle exam.                Assessment/Plan: Left knee OA plan is to proceed with an injection he understands all conservative things he can do in the importance of quadriceps and core strengthening and managing at ideal weight    Large Joint Arthrocentesis: L knee  Date/Time: 3/19/2020 8:31 AM  Consent given by: patient  Site marked: site marked  Timeout: Immediately prior to procedure a time out was called to verify the correct patient, procedure, equipment, support staff and site/side marked as required   Supporting Documentation  Indications: pain and joint swelling   Procedure Details  Location: knee - L  knee  Preparation: Patient was prepped and draped in the usual sterile fashion  Needle size: 22 G  Approach: anterolateral  Medications administered: 80 mg methylPREDNISolone acetate 80 MG/ML; 2 mL lidocaine (cardiac)  Patient tolerance: patient tolerated the procedure well with no immediate complications    Large Joint Arthrocentesis: R knee  Date/Time: 3/19/2020 8:31 AM  Consent given by: patient  Site marked: site marked  Timeout: Immediately prior to procedure a time out was called to verify the correct patient, procedure, equipment, support staff and site/side marked as required   Supporting Documentation  Indications: pain and joint swelling   Procedure Details  Location: knee - R knee  Preparation: Patient was prepped and draped in the usual sterile fashion  Needle size: 22 G  Approach: anterolateral  Medications administered: 80 mg methylPREDNISolone acetate 80 MG/ML; 4 mL lidocaine (cardiac)  Patient tolerance: patient tolerated the procedure well with no immediate complications

## 2020-03-19 ENCOUNTER — OFFICE VISIT (OUTPATIENT)
Dept: ORTHOPEDIC SURGERY | Facility: CLINIC | Age: 75
End: 2020-03-19

## 2020-03-19 VITALS — HEIGHT: 67 IN | TEMPERATURE: 97.5 F | BODY MASS INDEX: 27.47 KG/M2 | WEIGHT: 175 LBS

## 2020-03-19 DIAGNOSIS — M17.12 PRIMARY LOCALIZED OSTEOARTHROSIS OF LEFT LOWER LEG: ICD-10-CM

## 2020-03-19 DIAGNOSIS — M25.562 PAIN IN BOTH KNEES, UNSPECIFIED CHRONICITY: Primary | ICD-10-CM

## 2020-03-19 DIAGNOSIS — M25.561 PAIN IN BOTH KNEES, UNSPECIFIED CHRONICITY: Primary | ICD-10-CM

## 2020-03-19 PROCEDURE — 20610 DRAIN/INJ JOINT/BURSA W/O US: CPT | Performed by: ORTHOPAEDIC SURGERY

## 2020-03-19 RX ORDER — METHYLPREDNISOLONE ACETATE 80 MG/ML
80 INJECTION, SUSPENSION INTRA-ARTICULAR; INTRALESIONAL; INTRAMUSCULAR; SOFT TISSUE
Status: COMPLETED | OUTPATIENT
Start: 2020-03-19 | End: 2020-03-19

## 2020-03-19 RX ADMIN — METHYLPREDNISOLONE ACETATE 80 MG: 80 INJECTION, SUSPENSION INTRA-ARTICULAR; INTRALESIONAL; INTRAMUSCULAR; SOFT TISSUE at 08:31

## 2020-06-25 ENCOUNTER — CLINICAL SUPPORT (OUTPATIENT)
Dept: ORTHOPEDIC SURGERY | Facility: CLINIC | Age: 75
End: 2020-06-25

## 2020-06-25 VITALS — BODY MASS INDEX: 28.7 KG/M2 | WEIGHT: 178.6 LBS | HEIGHT: 66 IN

## 2020-06-25 DIAGNOSIS — M19.90 ARTHRITIS: Primary | ICD-10-CM

## 2020-06-25 PROCEDURE — 20610 DRAIN/INJ JOINT/BURSA W/O US: CPT | Performed by: ORTHOPAEDIC SURGERY

## 2020-06-25 RX ORDER — METHYLPREDNISOLONE ACETATE 80 MG/ML
80 INJECTION, SUSPENSION INTRA-ARTICULAR; INTRALESIONAL; INTRAMUSCULAR; SOFT TISSUE
Status: COMPLETED | OUTPATIENT
Start: 2020-06-25 | End: 2020-06-25

## 2020-06-25 RX ADMIN — METHYLPREDNISOLONE ACETATE 80 MG: 80 INJECTION, SUSPENSION INTRA-ARTICULAR; INTRALESIONAL; INTRAMUSCULAR; SOFT TISSUE at 07:41

## 2020-06-25 NOTE — PROGRESS NOTES
Patient: Jenaro Haji Jr.  YOB: 1945  Date of Service: 6/25/2020    Chief Complaints: Bilateral knee pain    Subjective:    History of Present Illness: Pt is seen in the office today with complaints of bilateral knee pain he gets intermittent injections his last was 3 months ago no new symptoms or injury.          Allergies:   Allergies   Allergen Reactions   • Contrast Dye Itching       Medications:   Home Medications:  Current Outpatient Medications on File Prior to Visit   Medication Sig   • aspirin 81 MG EC tablet Take 81 mg by mouth Daily.   • atorvastatin (LIPITOR) 40 MG tablet Take 40 mg by mouth Daily.   • glipiZIDE (GLUCOTROL) 10 MG tablet TAKE 1 TABLET TWICE DAILY   • levothyroxine (SYNTHROID, LEVOTHROID) 75 MCG tablet    • liothyronine (CYTOMEL) 5 MCG tablet    • losartan-hydrochlorothiazide (HYZAAR) 100-25 MG per tablet    • metFORMIN (GLUCOPHAGE) 1000 MG tablet TAKE 1 TABLET TWICE DAILY WITH MEALS   • RELION CONFIRM/MICRO TEST test strip CHECK BLOOD SUGAR ONCE DAILY   • vitamin B-6 (PYRIDOXINE) 50 MG tablet Take 100 mg by mouth Daily.     No current facility-administered medications on file prior to visit.      Current Medications:  Scheduled Meds:  Continuous Infusions:  No current facility-administered medications for this visit.   PRN Meds:.    I have reviewed the patient's medical history in detail and updated the computerized patient record.  Review and summarization of old records include:    Past Medical History:   Diagnosis Date   • Chronic kidney disease    • Diabetes mellitus (CMS/HCC)    • Hyperlipidemia    • Hypertension    • Kidney stone    • Lumbar spinal stenosis    • Nocturia    • Non-insulin dependent type 2 diabetes mellitus (CMS/HCC)    • Pain in both wrists    • Thyroid disease         Past Surgical History:   Procedure Laterality Date   • COLONOSCOPY  03/18/2011   • COLONOSCOPY N/A 11/17/2017    Procedure: COLONOSCOPY TO CECUM/TI WITH POLYPECTOMY ( COLD BX);   Surgeon: Kenan Galindo MD;  Location: [facility] ENDOSCOPY;  Service:    • HERNIA REPAIR     • INGUINAL HERNIA REPAIR     • QUADRICEPS TENDON REPAIR Right 1/18/2018    Procedure: QUADRICEPS TENDON REPAIR;  Surgeon: Brett Mclaughlin MD;  Location: [facility] MAIN OR;  Service:         Social History     Occupational History   • Not on file   Tobacco Use   • Smoking status: Former Smoker   • Smokeless tobacco: Never Used   Substance and Sexual Activity   • Alcohol use: Yes     Comment: weekly - moderate   • Drug use: No   • Sexual activity: Defer    Social History     Social History Narrative   • Not on file        Family History   Problem Relation Age of Onset   • Cancer Mother    • Diabetes Mother    • Aortic aneurysm Father         Abdominal   • Coronary artery disease Father    • Heart attack Father    • Hypertension Father    • Sudden death Father    • Stroke Paternal Uncle        ROS: 14 point review of systems was performed and was negative except for documented findings in HPI and today's encounter.     Allergies:   Allergies   Allergen Reactions   • Contrast Dye Itching     Constitutional:  Denies fever, shaking or chills   Eyes:  Denies change in visual acuity   HENT:  Denies nasal congestion or sore throat   Respiratory:  Denies cough or shortness of breath   Cardiovascular:  Denies chest pain or severe LE edema   GI:  Denies abdominal pain, nausea, vomiting, bloody stools or diarrhea   Musculoskeletal:  Numbness, tingling, or loss of motor function only as noted above in history of present illness.  : Denies painful urination or hematuria  Integument:  Denies rash, lesion or ulceration   Neurologic:  Denies headache or focal weakness  Endocrine:  Denies lymphadenopathy  Psych:  Denies confusion or change in mental status   Hem:  Denies active bleeding      Physical Exam: 75 y.o. male  Wt Readings from Last 3 Encounters:   03/19/20 79.4 kg (175 lb)   01/23/20 81.7 kg (180 lb 1.6 oz)   12/19/19 81.1  kg (178 lb 12.8 oz)       There is no height or weight on file to calculate BMI.  No height and weight on file for this encounter.  There were no vitals filed for this visit.  Vital signs reviewed.   General Appearance:    Alert, cooperative, in no acute distress                  Eyes: conjunctiva clear  ENT: external ears and nose atraumatic  CV: no peripheral edema  Resp: normal respiratory effort  Skin: no rashes or wounds; normal turgor  Psych: mood and affect appropriate  Lymph: no nodes appreciated  Neuro: gross sensation intact  Vascular:  Palpable peripheral pulse in noted extremity    Ortho exam    Exam is unchanged             Assessment: Bilateral knee pain which is degenerative in origin    Plan: Injection follow up as indicated.  Ice, elevate, and rest as needed.  Discussed conservative measures of pain control including ice, bracing.  Also talked about the importance of strengthening and maintaining ideal body weight    Kathrin Garza M.D.      Large Joint Arthrocentesis: R knee  Date/Time: 6/25/2020 7:41 AM  Consent given by: patient  Site marked: site marked  Timeout: Immediately prior to procedure a time out was called to verify the correct patient, procedure, equipment, support staff and site/side marked as required   Supporting Documentation  Indications: pain   Procedure Details  Location: knee - R knee  Needle gauge: 21.  Approach: anterolateral  Medications administered: 80 mg methylPREDNISolone acetate 80 MG/ML; 4 mL lidocaine (cardiac)  Patient tolerance: patient tolerated the procedure well with no immediate complications    Large Joint Arthrocentesis: L knee  Date/Time: 6/25/2020 7:41 AM  Consent given by: patient  Site marked: site marked  Timeout: Immediately prior to procedure a time out was called to verify the correct patient, procedure, equipment, support staff and site/side marked as required   Supporting Documentation  Indications: pain   Procedure Details  Location: knee - L  knee  Needle gauge: 21.  Approach: anterolateral  Medications administered: 4 mL lidocaine (cardiac); 80 mg methylPREDNISolone acetate 80 MG/ML  Patient tolerance: patient tolerated the procedure well with no immediate complications

## 2020-12-22 ENCOUNTER — TELEPHONE (OUTPATIENT)
Dept: ORTHOPEDIC SURGERY | Facility: CLINIC | Age: 75
End: 2020-12-22

## 2020-12-22 NOTE — TELEPHONE ENCOUNTER
Provider: DR.GEORGE Streeter: PATIENT    Relationship to Patient: SELF    Phone Number: [phone]    Reason for Call: PT. STATES THAT HE GOT CORTISONE INJECTIONS IN BOTH KNEES ABOUT 6 MONTHS AGO.   HE IS HAVING PAIN AGAIN AND WOULD LIKE TO KNOW IF POSSIBLE, IF HE CAN COME ON 12/29/20 OR THAT WEEK.   HE STATES THAT HE WILL GO TO EITHER OFFICE.   PLEASE CALL TO ADVISE.

## 2020-12-29 ENCOUNTER — CLINICAL SUPPORT (OUTPATIENT)
Dept: ORTHOPEDIC SURGERY | Facility: CLINIC | Age: 75
End: 2020-12-29

## 2020-12-29 VITALS — HEIGHT: 66 IN | BODY MASS INDEX: 28.61 KG/M2 | TEMPERATURE: 96.4 F | WEIGHT: 178 LBS

## 2020-12-29 DIAGNOSIS — M25.561 PAIN IN BOTH KNEES, UNSPECIFIED CHRONICITY: Primary | ICD-10-CM

## 2020-12-29 DIAGNOSIS — M25.562 PAIN IN BOTH KNEES, UNSPECIFIED CHRONICITY: Primary | ICD-10-CM

## 2020-12-29 DIAGNOSIS — M17.0 PRIMARY OSTEOARTHRITIS OF BOTH KNEES: ICD-10-CM

## 2020-12-29 PROCEDURE — 73562 X-RAY EXAM OF KNEE 3: CPT | Performed by: ORTHOPAEDIC SURGERY

## 2020-12-29 PROCEDURE — 20610 DRAIN/INJ JOINT/BURSA W/O US: CPT | Performed by: ORTHOPAEDIC SURGERY

## 2020-12-29 RX ORDER — METHYLPREDNISOLONE ACETATE 80 MG/ML
80 INJECTION, SUSPENSION INTRA-ARTICULAR; INTRALESIONAL; INTRAMUSCULAR; SOFT TISSUE
Status: COMPLETED | OUTPATIENT
Start: 2020-12-29 | End: 2020-12-29

## 2020-12-29 RX ADMIN — METHYLPREDNISOLONE ACETATE 80 MG: 80 INJECTION, SUSPENSION INTRA-ARTICULAR; INTRALESIONAL; INTRAMUSCULAR; SOFT TISSUE at 07:49

## 2020-12-29 RX ADMIN — METHYLPREDNISOLONE ACETATE 80 MG: 80 INJECTION, SUSPENSION INTRA-ARTICULAR; INTRALESIONAL; INTRAMUSCULAR; SOFT TISSUE at 07:50

## 2020-12-29 NOTE — PROGRESS NOTES
Patient: Jenaro Haji Jr.  YOB: 1945  Date of Service: 12/29/2020    Chief Complaints: Bilateral knee pain    Subjective:    History of Present Illness: Pt is seen in the office today with complaints of bilateral knee pain he does get intermittent injections its been about 6 months since his last one states they are bothering him now left is worse than right.          Allergies:   Allergies   Allergen Reactions   • Contrast Dye Itching       Medications:   Home Medications:  Current Outpatient Medications on File Prior to Visit   Medication Sig   • aspirin 81 MG EC tablet Take 81 mg by mouth Daily.   • atorvastatin (LIPITOR) 40 MG tablet Take 40 mg by mouth Daily.   • glipiZIDE (GLUCOTROL) 10 MG tablet TAKE 1 TABLET TWICE DAILY   • levothyroxine (SYNTHROID, LEVOTHROID) 75 MCG tablet    • liothyronine (CYTOMEL) 5 MCG tablet    • losartan-hydrochlorothiazide (HYZAAR) 100-25 MG per tablet    • metFORMIN (GLUCOPHAGE) 1000 MG tablet TAKE 1 TABLET TWICE DAILY WITH MEALS   • RELION CONFIRM/MICRO TEST test strip CHECK BLOOD SUGAR ONCE DAILY   • vitamin B-6 (PYRIDOXINE) 50 MG tablet Take 100 mg by mouth Daily.     No current facility-administered medications on file prior to visit.      Current Medications:  Scheduled Meds:  Continuous Infusions:No current facility-administered medications for this visit.     PRN Meds:.    I have reviewed the patient's medical history in detail and updated the computerized patient record.  Review and summarization of old records include:    Past Medical History:   Diagnosis Date   • Chronic kidney disease    • Diabetes mellitus (CMS/HCC)    • Hyperlipidemia    • Hypertension    • Kidney stone    • Lumbar spinal stenosis    • Nocturia    • Non-insulin dependent type 2 diabetes mellitus (CMS/HCC)    • Pain in both wrists    • Thyroid disease         Past Surgical History:   Procedure Laterality Date   • COLONOSCOPY  03/18/2011   • COLONOSCOPY N/A 11/17/2017    Procedure:  COLONOSCOPY TO CECUM/TI WITH POLYPECTOMY ( COLD BX);  Surgeon: Kenan Galindo MD;  Location: [facility] ENDOSCOPY;  Service:    • HERNIA REPAIR     • INGUINAL HERNIA REPAIR     • QUADRICEPS TENDON REPAIR Right 1/18/2018    Procedure: QUADRICEPS TENDON REPAIR;  Surgeon: Brett Mclaughlin MD;  Location: [facility] MAIN OR;  Service:         Social History     Occupational History   • Not on file   Tobacco Use   • Smoking status: Former Smoker   • Smokeless tobacco: Never Used   Substance and Sexual Activity   • Alcohol use: Yes     Comment: weekly - moderate   • Drug use: No   • Sexual activity: Defer      Social History     Social History Narrative   • Not on file        Family History   Problem Relation Age of Onset   • Cancer Mother    • Diabetes Mother    • Aortic aneurysm Father         Abdominal   • Coronary artery disease Father    • Heart attack Father    • Hypertension Father    • Sudden death Father    • Stroke Paternal Uncle        ROS: 14 point review of systems was performed and was negative except for documented findings in HPI and today's encounter.     Allergies:   Allergies   Allergen Reactions   • Contrast Dye Itching     Constitutional:  Denies fever, shaking or chills   Eyes:  Denies change in visual acuity   HENT:  Denies nasal congestion or sore throat   Respiratory:  Denies cough or shortness of breath   Cardiovascular:  Denies chest pain or severe LE edema   GI:  Denies abdominal pain, nausea, vomiting, bloody stools or diarrhea   Musculoskeletal:  Numbness, tingling, or loss of motor function only as noted above in history of present illness.  : Denies painful urination or hematuria  Integument:  Denies rash, lesion or ulceration   Neurologic:  Denies headache or focal weakness  Endocrine:  Denies lymphadenopathy  Psych:  Denies confusion or change in mental status   Hem:  Denies active bleeding      Physical Exam: 75 y.o. male  Wt Readings from Last 3 Encounters:   06/25/20 81 kg (178 lb  9.6 oz)   03/19/20 79.4 kg (175 lb)   01/23/20 81.7 kg (180 lb 1.6 oz)       There is no height or weight on file to calculate BMI.  No height and weight on file for this encounter.  There were no vitals filed for this visit.  Vital signs reviewed.   General Appearance:    Alert, cooperative, in no acute distress                    Ortho exam    Physical exam of the left knee reveals no effusion, no erythema.  It mild loss of extension and full flexion  Patient has mild varus alignment.  They have mild tenderness to palpation about the medial compartment, no tenderness laterally..  The patient has a negative bounce home, negative Torito and a stable ligamentous exam.  Quad tone is reasonable and symmetric.  There are no overlying skin changes no lymphedema no lymphadenopathy.  There is good hip range of motion which is full symmetric and asymptomatic and a normal ankle exam.    Physical exam of the right knee reveals no effusion, no erythema.  It mild loss of extension and full flexion  Patient has mild varus alignment.  They have mild tenderness to palpation about the medial compartment, no tenderness laterally..  The patient has a negative bounce home, negative Torito and a stable ligamentous exam.  Quad tone is reasonable and symmetric.  There are no overlying skin changes no lymphedema no lymphadenopathy.  There is good hip range of motion which is full symmetric and asymptomatic and a normal ankle exam.    X-rays AP lateral merchant view both knees were taken to evaluate his symptoms and compared to x-rays done 1 year ago I does have marked progression of his medial compartment narrowing on the left he has some mild patellofemoral OA bilaterally some mild narrowing of his medial compartment on the right he has evidence of vascular clips.         .time    Assessment: Bilateral knee DJD left his progress significantly since last x-rays plan is to proceed with injections we talked about knee replacement but he  states a very good friend of his recently  following a knee replacement and he is not interested in that at this time we will proceed with injections today    Plan:   Follow up as indicated.  Ice, elevate, and rest as needed.  Discussed conservative measures of pain control including ice, bracing.  Also talked about the importance of strengthening and maintaining ideal body weight    Kathrin Garza M.D.    Large Joint Arthrocentesis: R knee  Date/Time: 2020 7:49 AM  Consent given by: patient  Site marked: site marked  Timeout: Immediately prior to procedure a time out was called to verify the correct patient, procedure, equipment, support staff and site/side marked as required   Supporting Documentation  Indications: pain   Procedure Details  Location: knee - R knee  Preparation: Patient was prepped and draped in the usual sterile fashion  Needle gauge: 21g.  Approach: medial  Medications administered: 4 mL lidocaine (cardiac); 80 mg methylPREDNISolone acetate 80 MG/ML  Patient tolerance: patient tolerated the procedure well with no immediate complications    Large Joint Arthrocentesis: L knee  Date/Time: 2020 7:50 AM  Consent given by: patient  Site marked: site marked  Timeout: Immediately prior to procedure a time out was called to verify the correct patient, procedure, equipment, support staff and site/side marked as required   Supporting Documentation  Indications: pain   Procedure Details  Location: knee - L knee  Preparation: Patient was prepped and draped in the usual sterile fashion  Needle gauge: 21g.  Approach: medial  Medications administered: 4 mL lidocaine (cardiac); 80 mg methylPREDNISolone acetate 80 MG/ML  Patient tolerance: patient tolerated the procedure well with no immediate complications

## 2021-01-23 ENCOUNTER — IMMUNIZATION (OUTPATIENT)
Dept: VACCINE CLINIC | Facility: HOSPITAL | Age: 76
End: 2021-01-23

## 2021-01-23 PROCEDURE — 91300 HC SARSCOV02 VAC 30MCG/0.3ML IM: CPT | Performed by: INTERNAL MEDICINE

## 2021-01-23 PROCEDURE — 0001A: CPT | Performed by: INTERNAL MEDICINE

## 2021-02-13 ENCOUNTER — IMMUNIZATION (OUTPATIENT)
Dept: VACCINE CLINIC | Facility: HOSPITAL | Age: 76
End: 2021-02-13

## 2021-02-13 PROCEDURE — 0002A: CPT | Performed by: INTERNAL MEDICINE

## 2021-02-13 PROCEDURE — 91300 HC SARSCOV02 VAC 30MCG/0.3ML IM: CPT | Performed by: INTERNAL MEDICINE

## 2021-05-05 ENCOUNTER — TRANSCRIBE ORDERS (OUTPATIENT)
Dept: ADMINISTRATIVE | Facility: HOSPITAL | Age: 76
End: 2021-05-05

## 2021-05-05 DIAGNOSIS — Z13.9 ENCOUNTER FOR SCREENING: Primary | ICD-10-CM

## 2021-07-26 ENCOUNTER — TRANSCRIBE ORDERS (OUTPATIENT)
Dept: ADMINISTRATIVE | Facility: HOSPITAL | Age: 76
End: 2021-07-26

## 2021-07-26 ENCOUNTER — HOSPITAL ENCOUNTER (OUTPATIENT)
Dept: CARDIOLOGY | Facility: HOSPITAL | Age: 76
End: 2021-07-26

## 2021-07-26 DIAGNOSIS — Z13.9 VISIT FOR SCREENING: Primary | ICD-10-CM

## 2021-12-17 ENCOUNTER — HOSPITAL ENCOUNTER (OUTPATIENT)
Dept: CARDIOLOGY | Facility: HOSPITAL | Age: 76
Discharge: HOME | End: 2021-12-17
Admitting: SURGERY

## 2021-12-17 VITALS
DIASTOLIC BLOOD PRESSURE: 92 MMHG | BODY MASS INDEX: 29.82 KG/M2 | SYSTOLIC BLOOD PRESSURE: 155 MMHG | HEIGHT: 65 IN | WEIGHT: 179 LBS | HEART RATE: 74 BPM

## 2021-12-17 DIAGNOSIS — Z13.9 VISIT FOR SCREENING: ICD-10-CM

## 2021-12-17 LAB
BH CV ECHO MEAS - DIST AO DIAM: 1.59 CM
BH CV VAS BP LEFT ARM: NORMAL MMHG
BH CV VAS BP RIGHT ARM: NORMAL MMHG
BH CV XLRA MEAS - MID AO DIAM: 1.96 CM
BH CV XLRA MEAS - PAD LEFT ABI DP: 1.16
BH CV XLRA MEAS - PAD LEFT ABI PT: 1.11
BH CV XLRA MEAS - PAD LEFT ARM: 155 MMHG
BH CV XLRA MEAS - PAD LEFT LEG DP: 180 MMHG
BH CV XLRA MEAS - PAD LEFT LEG PT: 172 MMHG
BH CV XLRA MEAS - PAD RIGHT ABI DP: 1.12
BH CV XLRA MEAS - PAD RIGHT ABI PT: 1.15
BH CV XLRA MEAS - PAD RIGHT ARM: 152 MMHG
BH CV XLRA MEAS - PAD RIGHT LEG DP: 174 MMHG
BH CV XLRA MEAS - PAD RIGHT LEG PT: 178 MMHG
BH CV XLRA MEAS - PROX AO DIAM: 2.09 CM
BH CV XLRA MEAS LEFT ICA/CCA RATIO: 0.79
BH CV XLRA MEAS LEFT MID CCA PSV: NORMAL CM/SEC
BH CV XLRA MEAS LEFT MID ICA PSV: NORMAL CM/SEC
BH CV XLRA MEAS LEFT PROX ECA PSV: NORMAL CM/SEC
BH CV XLRA MEAS RIGHT ICA/CCA RATIO: 0.82
BH CV XLRA MEAS RIGHT MID CCA PSV: NORMAL CM/SEC
BH CV XLRA MEAS RIGHT MID ICA PSV: NORMAL CM/SEC
BH CV XLRA MEAS RIGHT PROX ECA PSV: NORMAL CM/SEC
MAXIMAL PREDICTED HEART RATE: 143 BPM
STRESS TARGET HR: 122 BPM

## 2021-12-17 PROCEDURE — 93799 UNLISTED CV SVC/PROCEDURE: CPT

## 2022-01-07 ENCOUNTER — OFFICE VISIT (OUTPATIENT)
Dept: ORTHOPEDIC SURGERY | Facility: CLINIC | Age: 77
End: 2022-01-07

## 2022-01-07 VITALS — WEIGHT: 175 LBS | TEMPERATURE: 97.1 F | BODY MASS INDEX: 28.12 KG/M2 | HEIGHT: 66 IN

## 2022-01-07 DIAGNOSIS — M17.0 PRIMARY OSTEOARTHRITIS OF BOTH KNEES: ICD-10-CM

## 2022-01-07 DIAGNOSIS — S46.012A TRAUMATIC TEAR OF LEFT ROTATOR CUFF, UNSPECIFIED TEAR EXTENT, INITIAL ENCOUNTER: Primary | ICD-10-CM

## 2022-01-07 PROCEDURE — 73562 X-RAY EXAM OF KNEE 3: CPT | Performed by: ORTHOPAEDIC SURGERY

## 2022-01-07 PROCEDURE — 20610 DRAIN/INJ JOINT/BURSA W/O US: CPT | Performed by: ORTHOPAEDIC SURGERY

## 2022-01-07 PROCEDURE — 99214 OFFICE O/P EST MOD 30 MIN: CPT | Performed by: ORTHOPAEDIC SURGERY

## 2022-01-07 PROCEDURE — 73030 X-RAY EXAM OF SHOULDER: CPT | Performed by: ORTHOPAEDIC SURGERY

## 2022-01-07 RX ORDER — TRIAMCINOLONE ACETONIDE 40 MG/ML
40 INJECTION, SUSPENSION INTRA-ARTICULAR; INTRAMUSCULAR
Status: COMPLETED | OUTPATIENT
Start: 2022-01-07 | End: 2022-01-07

## 2022-01-07 RX ADMIN — TRIAMCINOLONE ACETONIDE 40 MG: 40 INJECTION, SUSPENSION INTRA-ARTICULAR; INTRAMUSCULAR at 10:44

## 2022-01-07 NOTE — PROGRESS NOTES
New Left Shoulder and bilateral knees      Patient: Jenaro Haji Jr.        YOB: 1945    Medical Record Number: [id number]        Chief Complaints: left shoulder pain and bilateral knees      History of Present Illness: This is a 76-year-old male who presents complaining of left shoulder pain he states began on 27 December he was in a position of internal rotation and had severe pain. He states that did resolve over a couple days and has had 2 other episodes where all of a sudden his pain his shoulder hurts significantly and then it will resolve his last episode a few days ago it hurt and it is not resolved. He has no night pain he has pain with overhead activities right-hand-dominant symptoms are moderate intermittent to severe worse with certain activities and positions a lot of which she cannot recreate somewhat better with rest. He is also complaining of bilateral knee pain have seen him about a year ago for both knees left is worse than right no new history injury change in activity he would like those looked at as well. Symptoms are mild to severe depending on activity 10 out of 10 at times stabbing aching worse with activity somewhat better with rest past medical history is remote for diabetes hyperlipidemia hypertension chronic kidney disease      Allergies:   Allergies   Allergen Reactions   • Contrast Dye Itching       Medications:   Home Medications:  Current Outpatient Medications on File Prior to Visit   Medication Sig   • aspirin 81 MG EC tablet Take 81 mg by mouth Daily.   • atorvastatin (LIPITOR) 40 MG tablet Take 40 mg by mouth Daily.   • glipiZIDE (GLUCOTROL) 10 MG tablet TAKE 1 TABLET TWICE DAILY   • levothyroxine (SYNTHROID, LEVOTHROID) 75 MCG tablet    • liothyronine (CYTOMEL) 5 MCG tablet    • losartan-hydrochlorothiazide (HYZAAR) 100-25 MG per tablet    • metFORMIN (GLUCOPHAGE) 1000 MG tablet TAKE 1 TABLET TWICE DAILY WITH MEALS   • RELION CONFIRM/MICRO TEST test strip  CHECK BLOOD SUGAR ONCE DAILY   • vitamin B-6 (PYRIDOXINE) 50 MG tablet Take 100 mg by mouth Daily.     No current facility-administered medications on file prior to visit.     Current Medications:  Scheduled Meds:  Continuous Infusions:No current facility-administered medications for this visit.    PRN Meds:.    Past Medical History:   Diagnosis Date   • Chronic kidney disease    • Diabetes mellitus (HCC)    • Hyperlipidemia    • Hypertension    • Kidney stone    • Lumbar spinal stenosis    • Nocturia    • Non-insulin dependent type 2 diabetes mellitus (HCC)    • Pain in both wrists    • Thyroid disease         Past Surgical History:   Procedure Laterality Date   • COLONOSCOPY  03/18/2011   • COLONOSCOPY N/A 11/17/2017    Procedure: COLONOSCOPY TO CECUM/TI WITH POLYPECTOMY ( COLD BX);  Surgeon: Kenan Galindo MD;  Location: [facility] ENDOSCOPY;  Service:    • HERNIA REPAIR     • INGUINAL HERNIA REPAIR     • QUADRICEPS TENDON REPAIR Right 1/18/2018    Procedure: QUADRICEPS TENDON REPAIR;  Surgeon: Brett Mclaughlin MD;  Location: [facility] OR;  Service:    • SKIN CANCER EXCISION Left     hand        Social History     Occupational History   • Not on file   Tobacco Use   • Smoking status: Former Smoker   • Smokeless tobacco: Never Used   Vaping Use   • Vaping Use: Never used   Substance and Sexual Activity   • Alcohol use: Yes     Comment: weekly - moderate   • Drug use: No   • Sexual activity: Defer      Social History     Social History Narrative   • Not on file        Family History   Problem Relation Age of Onset   • Cancer Mother    • Diabetes Mother    • Aortic aneurysm Father         Abdominal   • Coronary artery disease Father    • Heart attack Father    • Hypertension Father    • Sudden death Father    • Stroke Paternal Uncle              Review of Systems: 14 point review of systems Humza for the shoulder pain and knee pain remainder negative per the patient    Review of Systems      Physical Exam:  "76 y.o. male  General Appearance:    Alert, cooperative, in no acute distress                   Vitals:    01/07/22 1009   Temp: 97.1 °F (36.2 °C)   Weight: 79.4 kg (175 lb)   Height: 167.6 cm (66\")   PainSc: 10-Worst pain ever      Patient is alert and read ×3 no acute distress appears her above-listed at height weight and age.  Affect is normal respiratory rate is normal unlabored. Heart rate regular rate rhythm, sclera, dentition and hearing are normal for the purpose of this exam.    Ortho Exam  Physical exam of the left knee reveals no effusion, no erythema.  It mild loss of extension and full flexion  Patient has mild varus alignment.  They have mild tenderness to palpation about the medial compartment, no tenderness laterally..  The patient has a negative bounce home, negative Torito and a stable ligamentous exam.  Quad tone is reasonable and symmetric.  There are no overlying skin changes no lymphedema no lymphadenopathy.  There is good hip range of motion which is full symmetric and asymptomatic and a normal ankle exam.    Physical exam of the right knee reveals no effusion, no erythema.  The patient has no palpable tenderness along the medial joint line, no tenderness about the lateral joint line.  Patient does have crepitus with patellofemoral range of motion.  They also have subjective symptoms anteriorly during exam.  The patient has a negative bounce home, negative Torito and a stable ligamentous exam.  Quad tone is reasonable and symmetric.  There are no overlying skin changes no lymphedema no lymphadenopathy.  There is good hip range of motion which is full symmetric and asymptomatic and a normal ankle exam.  Hamstrings and IT band are tight bilaterally.    Physical exam of the left shoulder reveals no overlying skin changes no lymphedema no lymphadenopathy.  Patient has active flexion 180 with mild symptoms abduction is similar external rotation is to 50 and internal rotation to the upper lumbar " spine with mild symptoms.  Patient has good rotator cuff strength 4+ over 5 with isometric strength testing with pain.  Patient has a positive impingement and a positive Johnson sign.  Patient has good cervical range of motion which is full and asymptomatic no radicular symptoms.  Patient has a normal elbow exam.  Good distal pulses are presentPatient has pain with overhead activity and a positive Neer sign and a positive empty can sign  They have a positive drop arm any definitive painful arc  He does have pain with stressing of his subscapularis and some pain with stressing of his biceps  Large Joint Arthrocentesis: R knee  Date/Time: 1/7/2022 10:44 AM  Consent given by: patient  Site marked: site marked  Timeout: Immediately prior to procedure a time out was called to verify the correct patient, procedure, equipment, support staff and site/side marked as required   Supporting Documentation  Indications: pain   Procedure Details  Location: knee - R knee  Preparation: Patient was prepped and draped in the usual sterile fashion  Needle gauge: 21G.  Approach: anteromedial  Medications administered: 4 mL lidocaine (cardiac); 40 mg triamcinolone acetonide 40 MG/ML  Patient tolerance: patient tolerated the procedure well with no immediate complications    Large Joint Arthrocentesis: L knee  Date/Time: 1/7/2022 10:44 AM  Consent given by: patient  Site marked: site marked  Timeout: Immediately prior to procedure a time out was called to verify the correct patient, procedure, equipment, support staff and site/side marked as required   Supporting Documentation  Indications: pain   Procedure Details  Location: knee - L knee  Preparation: Patient was prepped and draped in the usual sterile fashion  Needle gauge: 21G.  Approach: anteromedial  Medications administered: 4 mL lidocaine (cardiac); 40 mg triamcinolone acetonide 40 MG/ML  Patient tolerance: patient tolerated the procedure well with no immediate  complications                Radiology:   AP, Scapular Y and Axillary Lateral of the left shoulder were ordered/reviewed to evauate shoulder pain. I have no comparative films does have. The lateral clavicle that may be an old nonunion fracture or it may just be genetic in the right I do not think it is anything acute. He does have a little bit of arthritis in the shoulder joint self with small osteophyte seen inferiorly no acute pathology. Also took AP lateral merchant view of both knees to evaluate his symptoms compared x-rays done 1 year ago. He has moderate medial compartment narrowing on the left he has some mild patellofemoral OA on the right no significant change  Imaging Results (Most Recent)     Procedure Component Value Units Date/Time    XR Knee 3 View Bilateral [[id number]] Resulted: 01/07/22 1504     Updated: 01/07/22 1504    Impression:      Ordering physician's impression is located in the Encounter Note dated 01/07/22. X-ray performed in the DR room.      XR Shoulder 2+ View Left [[id number]] Resulted: 01/07/22 1502     Updated: 01/07/22 1503    Impression:      Ordering physician's impression is located in the Encounter Note dated 01/07/22. X-ray performed in the DR room.          Assessment/Plan: Bilateral knee pain I think is mostly degenerative plan is to proceed with injection he understands particularly on the right if his symptoms do not improve would consider an MRI we talked about the importance of quad and core strengthening and weight management as far as her shoulder goes my differential would be his subscapularis tear versus an unstable biceps tendon plan is to proceed with an MRI as he is pretty miserable answers for HPI/ROS submitted by the patient on 1/5/2022  What is the primary reason for your visit?: Other  Please describe your symptoms.: left shoulder pain  Have you had these symptoms before?: No  How long have you been having these symptoms?: 5-7 days  Please list any medications  you are currently taking for this condition.: Ibuprofen  Please describe any probable cause for these symptoms. : stretching

## 2022-01-18 ENCOUNTER — OFFICE VISIT (OUTPATIENT)
Dept: ORTHOPEDIC SURGERY | Facility: CLINIC | Age: 77
End: 2022-01-18

## 2022-01-18 VITALS — WEIGHT: 178 LBS | TEMPERATURE: 97.8 F | BODY MASS INDEX: 28.61 KG/M2 | HEIGHT: 66 IN

## 2022-01-18 DIAGNOSIS — M75.112 INCOMPLETE TEAR OF LEFT ROTATOR CUFF, UNSPECIFIED WHETHER TRAUMATIC: Primary | ICD-10-CM

## 2022-01-18 PROCEDURE — 99213 OFFICE O/P EST LOW 20 MIN: CPT | Performed by: ORTHOPAEDIC SURGERY

## 2022-01-18 NOTE — PROGRESS NOTES
"Shoulder MRI Follow Up      Patient: Jenaro Haji Jr.        YOB: 1945            Chief Complaints: Shoulder pain left      History of Present Illness: The patient is here follow-up of an MRI of the shoulder persistent left shoulder he was found to have some arthritis that is chronic clavicular joint he has diffuse tendinosis of the supraspinatus may be a rim rent tear he has some mild bursitis and a long head biceps tendon tear he states his symptoms have resolved is not sure why but happy where he is can do everything he wants to do does not want to pursue anything else    Physical Exam: 77 y.o. male  General Appearance:    Alert, cooperative, in no acute distress                   Vitals:    01/18/22 1409   Temp: 97.8 °F (36.6 °C)   Weight: 80.7 kg (178 lb)   Height: 167.6 cm (66\")        Patient is alert and read ×3 no acute distress appears her above-listed at height weight and age.  Affect is normal respiratory rate is normal unlabored. Heart rate regular rate rhythm, sclera, dentition and hearing are normal for the purpose of this exam.      Ortho Exam Physical exam of the left shoulder reveals no overlying skin changes no lymphedema no lymphadenopathy.  Patient has active flexion 180 with mild symptoms abduction is similar external rotation is to 50 and internal rotation to the upper lumbar spine with mild symptoms.  Patient has good rotator cuff strength 4+ over 5 with isometric strength testing with pain.  Patient has a positive impingement and a positive Johnson sign.  Patient has good cervical range of motion which is full and asymptomatic no radicular symptoms.  Patient has a normal elbow exam.  Good distal pulses are presentPatient has pain with overhead activity and a positive Neer sign and a positive empty can sign  They have a positive drop arm any definitive painful arc        MRI Results: MRIs as above have reviewed the films myself and agree with findings    Assessment/Plan: Left " shoulder pain does have some tendinitis of the rotator cuff he has a long head of the biceps tendon tear some degenerative changes he is doing well at this time he understands his options going forward would be injections is what I would start with first if his symptoms return this was all discussed in detail with the patient

## 2022-05-12 ENCOUNTER — TELEPHONE (OUTPATIENT)
Dept: GASTROENTEROLOGY | Facility: CLINIC | Age: 77
End: 2022-05-12

## 2022-05-12 NOTE — TELEPHONE ENCOUNTER
Last scope 11/17/2017 in epic-- personal hx of polyps-- no family hx of polyps or colon ca-- ASA-- medications:    aspirin 81 MG EC tablet    atorvastatin (LIPITOR) 40 MG tablet    glipiZIDE (GLUCOTROL) 10 MG tablet    levothyroxine (SYNTHROID, LEVOTHROID) 75 MCG tablet    liothyronine (CYTOMEL) 5 MCG tablet    losartan-hydrochlorothiazide (HYZAAR) 100-25 MG per tablet    metFORMIN (GLUCOPHAGE) 1000 MG tablet    RELION CONFIRM/MICRO TEST test strip    vitamin B-6 (PYRIDOXINE) 50 MG tablet    OA form scanned into media

## 2022-05-30 ENCOUNTER — PREP FOR SURGERY (OUTPATIENT)
Dept: OTHER | Facility: HOSPITAL | Age: 77
End: 2022-05-30

## 2022-05-30 DIAGNOSIS — Z86.010 HISTORY OF ADENOMATOUS POLYP OF COLON: Primary | ICD-10-CM

## 2022-05-31 ENCOUNTER — TELEPHONE (OUTPATIENT)
Dept: GASTROENTEROLOGY | Facility: CLINIC | Age: 77
End: 2022-05-31

## 2022-05-31 NOTE — TELEPHONE ENCOUNTER
lavon De Oliveira for 8/8 arrive at 100/cs- mailing out prep inst on 5/31, advised pt time is subject to change [facility] will call.

## 2022-08-05 RX ORDER — MULTIPLE VITAMINS W/ MINERALS TAB 9MG-400MCG
1 TAB ORAL DAILY
COMMUNITY

## 2022-08-08 ENCOUNTER — ANESTHESIA (OUTPATIENT)
Dept: GASTROENTEROLOGY | Facility: HOSPITAL | Age: 77
End: 2022-08-08

## 2022-08-08 ENCOUNTER — HOSPITAL ENCOUNTER (OUTPATIENT)
Facility: HOSPITAL | Age: 77
Setting detail: HOSPITAL OUTPATIENT SURGERY
Discharge: HOME | End: 2022-08-08
Attending: INTERNAL MEDICINE | Admitting: INTERNAL MEDICINE

## 2022-08-08 ENCOUNTER — ANESTHESIA EVENT (OUTPATIENT)
Dept: GASTROENTEROLOGY | Facility: HOSPITAL | Age: 77
End: 2022-08-08

## 2022-08-08 VITALS
WEIGHT: 177 LBS | RESPIRATION RATE: 18 BRPM | SYSTOLIC BLOOD PRESSURE: 122 MMHG | TEMPERATURE: 97.9 F | BODY MASS INDEX: 27.78 KG/M2 | OXYGEN SATURATION: 95 % | HEIGHT: 67 IN | HEART RATE: 56 BPM | DIASTOLIC BLOOD PRESSURE: 89 MMHG

## 2022-08-08 LAB — GLUCOSE BLDC GLUCOMTR-MCNC: 138 MG/DL (ref 70–130)

## 2022-08-08 PROCEDURE — S0260 H&P FOR SURGERY: HCPCS | Performed by: INTERNAL MEDICINE

## 2022-08-08 PROCEDURE — 25010000002 PROPOFOL 10 MG/ML EMULSION: Performed by: NURSE ANESTHETIST, CERTIFIED REGISTERED

## 2022-08-08 PROCEDURE — G0105 COLORECTAL SCRN; HI RISK IND: HCPCS | Performed by: INTERNAL MEDICINE

## 2022-08-08 PROCEDURE — 82962 GLUCOSE BLOOD TEST: CPT

## 2022-08-08 RX ORDER — SODIUM CHLORIDE, SODIUM LACTATE, POTASSIUM CHLORIDE, CALCIUM CHLORIDE 600; 310; 30; 20 MG/100ML; MG/100ML; MG/100ML; MG/100ML
30 INJECTION, SOLUTION INTRAVENOUS CONTINUOUS PRN
Status: DISCONTINUED | OUTPATIENT
Start: 2022-08-08 | End: 2022-08-08 | Stop reason: HOSPADM

## 2022-08-08 RX ORDER — SODIUM CHLORIDE, SODIUM LACTATE, POTASSIUM CHLORIDE, CALCIUM CHLORIDE 600; 310; 30; 20 MG/100ML; MG/100ML; MG/100ML; MG/100ML
INJECTION, SOLUTION INTRAVENOUS CONTINUOUS PRN
Status: DISCONTINUED | OUTPATIENT
Start: 2022-08-08 | End: 2022-08-08 | Stop reason: SURG

## 2022-08-08 RX ORDER — PROPOFOL 10 MG/ML
VIAL (ML) INTRAVENOUS CONTINUOUS PRN
Status: DISCONTINUED | OUTPATIENT
Start: 2022-08-08 | End: 2022-08-08 | Stop reason: SURG

## 2022-08-08 RX ORDER — PROPOFOL 10 MG/ML
VIAL (ML) INTRAVENOUS AS NEEDED
Status: DISCONTINUED | OUTPATIENT
Start: 2022-08-08 | End: 2022-08-08 | Stop reason: SURG

## 2022-08-08 RX ORDER — LIDOCAINE HYDROCHLORIDE 20 MG/ML
INJECTION, SOLUTION INFILTRATION; PERINEURAL AS NEEDED
Status: DISCONTINUED | OUTPATIENT
Start: 2022-08-08 | End: 2022-08-08 | Stop reason: SURG

## 2022-08-08 RX ADMIN — SODIUM CHLORIDE, POTASSIUM CHLORIDE, SODIUM LACTATE AND CALCIUM CHLORIDE: 600; 310; 30; 20 INJECTION, SOLUTION INTRAVENOUS at 13:25

## 2022-08-08 RX ADMIN — SODIUM CHLORIDE, POTASSIUM CHLORIDE, SODIUM LACTATE AND CALCIUM CHLORIDE 30 ML/HR: 600; 310; 30; 20 INJECTION, SOLUTION INTRAVENOUS at 13:44

## 2022-08-08 RX ADMIN — Medication 100 MG: at 13:54

## 2022-08-08 RX ADMIN — PROPOFOL 300 MCG/KG/MIN: 10 INJECTION, EMULSION INTRAVENOUS at 13:54

## 2022-08-08 RX ADMIN — LIDOCAINE HYDROCHLORIDE 60 MG: 20 INJECTION, SOLUTION INFILTRATION; PERINEURAL at 13:54

## 2022-08-08 NOTE — H&P
[facility]  Pre Procedure History & Physical    Chief Complaint:   History colon polyps    Subjective     HPI:   Patient 77-year-old male with history hypertension, hyperlipidemia, diabetes with renal insufficiency and kidney stones presenting for colonoscopy surveillance.  Patient with history of colon polyps here for colonoscopy.    Past Medical History:   Past Medical History:   Diagnosis Date   • Arthritis    • Cancer (HCC)     skin   • Cataracts, bilateral    • Chronic kidney disease     kidney stones   • Diabetes mellitus (HCC)    • Hyperlipidemia    • Hypertension    • Kidney stone    • Lumbar spinal stenosis    • Nocturia    • Non-insulin dependent type 2 diabetes mellitus (HCC)    • Pain in both wrists    • Shortness of breath    • Thyroid disease     hypothryoid       Past Surgical History:  Past Surgical History:   Procedure Laterality Date   • COLONOSCOPY  03/18/2011   • COLONOSCOPY N/A 11/17/2017    Procedure: COLONOSCOPY TO CECUM/TI WITH POLYPECTOMY ( COLD BX);  Surgeon: Kenan Galindo MD;  Location: [facility] ENDOSCOPY;  Service:    • HERNIA REPAIR     • INGUINAL HERNIA REPAIR     • QUADRICEPS TENDON REPAIR Right 1/18/2018    Procedure: QUADRICEPS TENDON REPAIR;  Surgeon: Brett Mclaughlin MD;  Location: [facility] MAIN OR;  Service:    • SKIN CANCER EXCISION Left     hand       Family History:  Family History   Problem Relation Age of Onset   • Cancer Mother    • Diabetes Mother    • Aortic aneurysm Father         Abdominal   • Coronary artery disease Father    • Heart attack Father    • Hypertension Father    • Sudden death Father    • Stroke Paternal Uncle    • Malig Hyperthermia Neg Hx        Social History:   reports that he has quit smoking. He has never used smokeless tobacco. He reports current alcohol use. He reports that he does not use drugs.    Medications:   Medications Prior to Admission   Medication Sig Dispense Refill Last Dose   • aspirin 81 MG EC tablet  "Take 81 mg by mouth Daily.   8/7/2022 at Unknown time   • atorvastatin (LIPITOR) 40 MG tablet Take 40 mg by mouth Daily.   8/7/2022 at Unknown time   • glipiZIDE (GLUCOTROL) 10 MG tablet TAKE 1 TABLET TWICE DAILY 180 tablet 2 8/7/2022 at Unknown time   • levothyroxine (SYNTHROID, LEVOTHROID) 75 MCG tablet Take 75 mcg by mouth Daily.   8/7/2022 at Unknown time   • liothyronine (CYTOMEL) 5 MCG tablet Take 5 mcg by mouth Daily.   8/7/2022 at Unknown time   • losartan-hydrochlorothiazide (HYZAAR) 100-25 MG per tablet Take 1 tablet by mouth Daily.   8/7/2022 at Unknown time   • metFORMIN (GLUCOPHAGE) 1000 MG tablet TAKE 1 TABLET TWICE DAILY WITH MEALS 180 tablet 2 8/7/2022 at Unknown time   • multivitamin with minerals tablet tablet Take 1 tablet by mouth Daily.   8/7/2022 at Unknown time   • RELION CONFIRM/MICRO TEST test strip CHECK BLOOD SUGAR ONCE DAILY 100 each 3 8/7/2022 at Unknown time   • VITAMIN B COMPLEX-C PO Take  by mouth.   8/7/2022 at Unknown time   • vitamin B-6 (PYRIDOXINE) 50 MG tablet Take 100 mg by mouth Daily.   8/7/2022 at Unknown time       Allergies:  Contrast dye    ROS:    Pertinent items are noted in HPI     Objective     Blood pressure 150/93, pulse 65, resp. rate 16, height 170.2 cm (67\"), weight 80.3 kg (177 lb), SpO2 97 %.    Physical Exam   Constitutional: Pt is oriented to person, place, and time and well-developed, well-nourished, and in no distress.   Mouth/Throat: Oropharynx is clear and moist.   Neck: Normal range of motion.   Cardiovascular: Normal rate, regular rhythm and normal heart sounds.    Pulmonary/Chest: Effort normal and breath sounds normal.   Abdominal: Soft. Nontender  Skin: Skin is warm and dry.   Psychiatric: Mood, memory, affect and judgment normal.     Assessment & Plan     Diagnosis:  History colon polyps    Anticipated Surgical Procedure:  Colonoscopy    The risks, benefits, and alternatives of this procedure have been discussed with the patient or the responsible " party- the patient understands and agrees to proceed.

## 2022-08-08 NOTE — DISCHARGE INSTRUCTIONS
For the next 24 hours patient needs to be with a responsible adult.    For 24 hours DO NOT drive, operate machinery, appliances, drink alcohol, make important decisions or sign legal documents.    Start with a light or bland diet if you are feeling sick to your stomach otherwise advance to regular diet as tolerated.    Follow recommendations on procedure report if provided by your doctor.    Call Dr Galindo for problems [phone]    Problems may include but not limited to: large amounts of bleeding, trouble breathing, repeated vomiting, severe unrelieved pain, fever or chills.

## 2022-08-08 NOTE — BRIEF OP NOTE
COLONOSCOPY  Progress Note    Jenaro Haji Jr.  8/8/2022    Pre-op Diagnosis:   History of adenomatous polyp of colon [Z86.010]       Post-Op Diagnosis Codes:     * History of adenomatous polyp of colon [Z86.010]     * Diverticulosis [K57.90]     * Internal hemorrhoids [K64.8]    Procedure/CPT® Codes:        Procedure(s):  COLONOSCOPY into cecum and terminal ileum    Surgeon(s):  Kenan Galidno MD    Anesthesia: Monitored Anesthesia Care    Staff:   Endo Technician: Laura Burrows  Endo Nurse: Leyda Valderrama RN         Estimated Blood Loss: none    Urine Voided: * No values recorded between 8/8/2022  1:50 PM and 8/8/2022  2:09 PM *    Specimens:                None          Drains: * No LDAs found *    Findings: Colonoscopy to the terminal ileum with normal ileum mucosa.  Sigmoid diverticulosis and internal hemorrhoids noted        Complications: None          Kenan Galindo MD     Date: 8/8/2022  Time: 14:10 EDT

## 2022-08-08 NOTE — ANESTHESIA POSTPROCEDURE EVALUATION
"Patient: Jenaro Haji Jr.    Procedure Summary     Date: 08/08/22 Room / Location: [facility] ENDOSCOPY 6 / [facility] ENDOSCOPY    Anesthesia Start: 1350 Anesthesia Stop: 1412    Procedure: COLONOSCOPY into cecum and terminal ileum (N/A ) Diagnosis:       History of adenomatous polyp of colon      Diverticulosis      Internal hemorrhoids      (History of adenomatous polyp of colon [Z86.010])    Surgeons: Kenan Galindo MD Provider: Juliann Bello MD    Anesthesia Type: MAC ASA Status: 2          Anesthesia Type: MAC    Vitals  Vitals Value Taken Time   /89 08/08/22 1435   Temp 36.6 °C (97.9 °F) 08/08/22 1440   Pulse 71 08/08/22 1443   Resp 18 08/08/22 1414   SpO2 96 % 08/08/22 1442   Vitals shown include unvalidated device data.        Post Anesthesia Care and Evaluation    Patient location during evaluation: PHASE II  Patient participation: complete - patient participated  Level of consciousness: awake  Pain management: adequate    Airway patency: patent  Anesthetic complications: No anesthetic complications    Cardiovascular status: acceptable  Respiratory status: acceptable  Hydration status: acceptable    Comments: /89   Pulse 56   Temp 36.6 °C (97.9 °F) (Oral)   Resp 18   Ht 170.2 cm (67\")   Wt 80.3 kg (177 lb)   SpO2 95%   BMI 27.72 kg/m²       "

## 2022-08-08 NOTE — ANESTHESIA PREPROCEDURE EVALUATION
Anesthesia Evaluation     Patient summary reviewed and Nursing notes reviewed                Airway   Mallampati: II  Dental - normal exam     Pulmonary - normal exam   (+) shortness of breath,   Cardiovascular - normal exam    ECG reviewed    (+) hypertension, hyperlipidemia,     ROS comment: Reviewed stress test    · Findings consistent with a normal ECG stress test.           Neuro/Psych  (+) numbness,    GI/Hepatic/Renal/Endo    (+) obesity,   renal disease stones and CRI, diabetes mellitus type 2, thyroid problem     Musculoskeletal     Abdominal   (+) obese,    Substance History      OB/GYN          Other                      Anesthesia Plan    ASA 2     MAC       Anesthetic plan, risks, benefits, and alternatives have been provided, discussed and informed consent has been obtained with: patient.        CODE STATUS:

## 2022-12-05 ENCOUNTER — TELEPHONE (OUTPATIENT)
Dept: ORTHOPEDIC SURGERY | Facility: CLINIC | Age: 77
End: 2022-12-05

## 2022-12-05 NOTE — TELEPHONE ENCOUNTER
Caller: PATIENT     Relationship to patient: SELF     Best call back number: [phone]    Chief complaint: BILATERAL KNEE PAIN     Type of visit: CORTISONE INJECTIONS

## 2022-12-14 NOTE — PROGRESS NOTES
"  Patient: Jenaro Haji Jr.  YOB: 1945  Date of Service: 12/14/2022    Chief Complaints: Bilateral knee left shoulder and right hip pain    Subjective:    History of Present Illness: Pt is seen in the office today with complaints of bilateral knee pain he has no degenerative changes gets intermittent injections last one was actually the first of this year he states most recently his knees have been bothering him.  He does have an MRI of his left knee that shows significant degenerative changes with full-thickness chondral loss and a meniscus tear we have been treating this conservatively with injections been several months since his last injections really the first of the year.  Left shoulder we have an MRI that shows some tendinopathy of the rotator cuff and some degenerative changes he knows his options of intermittent injections with that.  He is complaining of a new injury to his right hip is primarily groin pain no history injury change in activity has been ongoing for just a couple of weeks past medical history is well listed below including diabetes        Allergies:   Allergies   Allergen Reactions   • Contrast Dye Hives, Itching and Other (See Comments)     \"felt like something in my throat.\"       Medications:   Home Medications:  Current Outpatient Medications on File Prior to Visit   Medication Sig   • aspirin 81 MG EC tablet Take 81 mg by mouth Daily.   • atorvastatin (LIPITOR) 40 MG tablet Take 40 mg by mouth Daily.   • glipiZIDE (GLUCOTROL) 10 MG tablet TAKE 1 TABLET TWICE DAILY   • levothyroxine (SYNTHROID, LEVOTHROID) 75 MCG tablet Take 75 mcg by mouth Daily.   • liothyronine (CYTOMEL) 5 MCG tablet Take 5 mcg by mouth Daily.   • losartan-hydrochlorothiazide (HYZAAR) 100-25 MG per tablet Take 1 tablet by mouth Daily.   • metFORMIN (GLUCOPHAGE) 1000 MG tablet TAKE 1 TABLET TWICE DAILY WITH MEALS   • multivitamin with minerals tablet tablet Take 1 tablet by mouth Daily.   • RELION " CONFIRM/MICRO TEST test strip CHECK BLOOD SUGAR ONCE DAILY   • VITAMIN B COMPLEX-C PO Take  by mouth.   • vitamin B-6 (PYRIDOXINE) 50 MG tablet Take 100 mg by mouth Daily.     No current facility-administered medications on file prior to visit.     Current Medications:  Scheduled Meds:  Continuous Infusions:No current facility-administered medications for this visit.    PRN Meds:.    I have reviewed the patient's medical history in detail and updated the computerized patient record.  Review and summarization of old records include:    Past Medical History:   Diagnosis Date   • Arthritis    • Cancer (HCC)     skin   • Cataracts, bilateral    • Chronic kidney disease     kidney stones   • Diabetes mellitus (HCC)    • Hyperlipidemia    • Hypertension    • Kidney stone    • Lumbar spinal stenosis    • Nocturia    • Non-insulin dependent type 2 diabetes mellitus (HCC)    • Pain in both wrists    • Shortness of breath    • Thyroid disease     hypothryoid        Past Surgical History:   Procedure Laterality Date   • COLONOSCOPY  03/18/2011   • COLONOSCOPY N/A 11/17/2017    Procedure: COLONOSCOPY TO CECUM/TI WITH POLYPECTOMY ( COLD BX);  Surgeon: Kenan Galindo MD;  Location: [facility] ENDOSCOPY;  Service:    • COLONOSCOPY N/A 8/8/2022    Procedure: COLONOSCOPY into cecum and terminal ileum;  Surgeon: Kenan Galindo MD;  Location: [facility] ENDOSCOPY;  Service: Gastroenterology;  Laterality: N/A;  pre: personal hx of colon polyps   post: diverticulosis    • HERNIA REPAIR     • INGUINAL HERNIA REPAIR     • QUADRICEPS TENDON REPAIR Right 1/18/2018    Procedure: QUADRICEPS TENDON REPAIR;  Surgeon: Brett Mclaughlin MD;  Location: [facility] OR;  Service:    • SKIN CANCER EXCISION Left     hand        Social History     Occupational History   • Not on file   Tobacco Use   • Smoking status: Former   • Smokeless tobacco: Never   Vaping Use   • Vaping Use: Never used   Substance and Sexual Activity   • Alcohol use: Yes  "    Comment: weekly - moderate   • Drug use: No   • Sexual activity: Defer      Social History     Social History Narrative   • Not on file        Family History   Problem Relation Age of Onset   • Cancer Mother    • Diabetes Mother    • Aortic aneurysm Father         Abdominal   • Coronary artery disease Father    • Heart attack Father    • Hypertension Father    • Sudden death Father    • Stroke Paternal Uncle    • Malig Hyperthermia Neg Hx        ROS: 14 point review of systems was performed and was negative except for documented findings in HPI and today's encounter.     Allergies:   Allergies   Allergen Reactions   • Contrast Dye Hives, Itching and Other (See Comments)     \"felt like something in my throat.\"     Constitutional:  Denies fever, shaking or chills   Eyes:  Denies change in visual acuity   HENT:  Denies nasal congestion or sore throat   Respiratory:  Denies cough or shortness of breath   Cardiovascular:  Denies chest pain or severe LE edema   GI:  Denies abdominal pain, nausea, vomiting, bloody stools or diarrhea   Musculoskeletal:  Numbness, tingling, or loss of motor function only as noted above in history of present illness.  : Denies painful urination or hematuria  Integument:  Denies rash, lesion or ulceration   Neurologic:  Denies headache or focal weakness  Endocrine:  Denies lymphadenopathy  Psych:  Denies confusion or change in mental status   Hem:  Denies active bleeding      Physical Exam: 77 y.o. male  Wt Readings from Last 3 Encounters:   08/08/22 80.3 kg (177 lb)   01/18/22 80.7 kg (178 lb)   01/07/22 79.4 kg (175 lb)       There is no height or weight on file to calculate BMI.  No height and weight on file for this encounter.  There were no vitals filed for this visit.  Vital signs reviewed.   General Appearance:    Alert, cooperative, in no acute distress                    Ortho exam  Physical exam of the right and left knee reveals no effusion, no erythema.  It mild loss of " extension and full flexion  Patient has mild varus alignment.  They have mild tenderness to palpation about the medial compartment, no tenderness laterally..  The patient has a negative bounce home, negative Torito and a stable ligamentous exam.  Quad tone is reasonable and symmetric.  There are no overlying skin changes no lymphedema no lymphadenopathy.  There is good hip range of motion which is full symmetric and asymptomatic and a normal ankle exam.  He does have some subjective pain in the right groin with range of motion but still good range of motion    Physical exam of the left shoulder reveals no overlying skin changes no lymphedema no lymphadenopathy.  Patient has active flexion 180 with mild symptoms abduction is similar external rotation is to 50 and internal rotation to the upper lumbar spine with mild symptoms.  Patient has good rotator cuff strength 4+ over 5 with isometric strength testing with pain.  Patient has a positive impingement and a positive Johnson sign.  Patient has good cervical range of motion which is full and asymptomatic no radicular symptoms.  Patient has a normal elbow exam.  Good distal pulses are presentPatient has pain with overhead activity and a positive Neer sign and a positive empty can sign  They have a positive drop arm any definitive painful arc      Bilateral knee pain I still think this is more degenerative I think is reasonable to inject him as far as her shoulder goes we discussed his findings again I think we could inject that but certainly not today only 2 at a time if the right hip pain persists despite conservative measures I would proceed with an x-ray of that hip  .time    Assessment:     Plan:   Follow up as indicated.  Ice, elevate, and rest as needed.  Discussed conservative measures of pain control including ice, bracing.  Also talked about the importance of strengthening   Kathrin Garza M.D.

## 2022-12-15 ENCOUNTER — CLINICAL SUPPORT (OUTPATIENT)
Dept: ORTHOPEDIC SURGERY | Facility: CLINIC | Age: 77
End: 2022-12-15

## 2022-12-15 VITALS — HEIGHT: 66 IN | WEIGHT: 175.1 LBS | BODY MASS INDEX: 28.14 KG/M2 | TEMPERATURE: 96.5 F

## 2022-12-15 DIAGNOSIS — M17.10 PRIMARY LOCALIZED OSTEOARTHROSIS OF LOWER LEG, UNSPECIFIED LATERALITY: Primary | ICD-10-CM

## 2022-12-15 DIAGNOSIS — M75.112 INCOMPLETE TEAR OF LEFT ROTATOR CUFF, UNSPECIFIED WHETHER TRAUMATIC: ICD-10-CM

## 2022-12-15 DIAGNOSIS — M25.551 RIGHT HIP PAIN: ICD-10-CM

## 2022-12-15 PROCEDURE — 99213 OFFICE O/P EST LOW 20 MIN: CPT | Performed by: ORTHOPAEDIC SURGERY

## 2023-05-03 ENCOUNTER — TELEPHONE (OUTPATIENT)
Dept: ORTHOPEDIC SURGERY | Facility: CLINIC | Age: 78
End: 2023-05-03
Payer: MEDICARE

## 2023-05-03 NOTE — TELEPHONE ENCOUNTER
Caller: SULLY WARREN    Relationship to patient: SELF    Best call back number: [phone]    Chief complaint: RIGHT HIP AND RIGHT KNEE INJECTION    Type of visit: RIGHT HIP AND RIGHT KNEE INJECTION    Requested date: NA    If rescheduling, when is the original appointment:  NA    Additional notes: NA

## 2023-05-17 NOTE — PROGRESS NOTES
"  Patient: Jenaro Haji Jr.  YOB: 1945  Date of Service: 5/17/2023    Chief Complaints: Bilateral knee right hip pain    Subjective:    History of Present Illness: Pt is seen in the office today with complaints of bilateral knee and right hip pain he has no degenerative changes in his knees is intermittent injections he is here for Durolane he tolerates injections well is not interested in surgical.  He had significant right hip pain lateral and a little anterior he states it was severe to where he could hardly walk and that about 4 days ago it just suddenly disappeared he has currently has no pain in the hip but wanted it looked at        Allergies:   Allergies   Allergen Reactions   • Contrast Dye (Echo Or Unknown Ct/Mr) Hives, Itching and Other (See Comments)     \"felt like something in my throat.\"       Medications:   Home Medications:  Current Outpatient Medications on File Prior to Visit   Medication Sig   • aspirin 81 MG EC tablet Take 81 mg by mouth Daily.   • atorvastatin (LIPITOR) 40 MG tablet Take 40 mg by mouth Daily.   • glipiZIDE (GLUCOTROL) 10 MG tablet TAKE 1 TABLET TWICE DAILY   • levothyroxine (SYNTHROID, LEVOTHROID) 75 MCG tablet Take 75 mcg by mouth Daily.   • liothyronine (CYTOMEL) 5 MCG tablet Take 5 mcg by mouth Daily.   • losartan-hydrochlorothiazide (HYZAAR) 100-25 MG per tablet Take 1 tablet by mouth Daily.   • metFORMIN (GLUCOPHAGE) 1000 MG tablet TAKE 1 TABLET TWICE DAILY WITH MEALS   • multivitamin with minerals tablet tablet Take 1 tablet by mouth Daily.   • RELION CONFIRM/MICRO TEST test strip CHECK BLOOD SUGAR ONCE DAILY   • VITAMIN B COMPLEX-C PO Take  by mouth.   • vitamin B-6 (PYRIDOXINE) 50 MG tablet Take 100 mg by mouth Daily.     No current facility-administered medications on file prior to visit.     Current Medications:  Scheduled Meds:  Continuous Infusions:No current facility-administered medications for this visit.    PRN Meds:.    I have reviewed the " patient's medical history in detail and updated the computerized patient record.  Review and summarization of old records include:    Past Medical History:   Diagnosis Date   • Arthritis    • Cancer     skin   • Cataracts, bilateral    • Chronic kidney disease     kidney stones   • Diabetes mellitus    • Hyperlipidemia    • Hypertension    • Kidney stone    • Lumbar spinal stenosis    • Nocturia    • Non-insulin dependent type 2 diabetes mellitus    • Pain in both wrists    • Shortness of breath    • Thyroid disease     hypothryoid        Past Surgical History:   Procedure Laterality Date   • COLONOSCOPY  03/18/2011   • COLONOSCOPY N/A 11/17/2017    Procedure: COLONOSCOPY TO CECUM/TI WITH POLYPECTOMY ( COLD BX);  Surgeon: Kenan Galindo MD;  Location: [facility] ENDOSCOPY;  Service:    • COLONOSCOPY N/A 8/8/2022    Procedure: COLONOSCOPY into cecum and terminal ileum;  Surgeon: Kenan Galindo MD;  Location: [facility] ENDOSCOPY;  Service: Gastroenterology;  Laterality: N/A;  pre: personal hx of colon polyps   post: diverticulosis    • HERNIA REPAIR     • INGUINAL HERNIA REPAIR     • QUADRICEPS TENDON REPAIR Right 1/18/2018    Procedure: QUADRICEPS TENDON REPAIR;  Surgeon: Brett Mclaughlin MD;  Location: [facility] OR;  Service:    • SKIN CANCER EXCISION Left     hand        Social History     Occupational History   • Not on file   Tobacco Use   • Smoking status: Former   • Smokeless tobacco: Never   Vaping Use   • Vaping Use: Never used   Substance and Sexual Activity   • Alcohol use: Yes     Comment: weekly - moderate   • Drug use: No   • Sexual activity: Defer      Social History     Social History Narrative   • Not on file        Family History   Problem Relation Age of Onset   • Cancer Mother    • Diabetes Mother    • Aortic aneurysm Father         Abdominal   • Coronary artery disease Father    • Heart attack Father    • Hypertension Father    • Sudden death Father    • Stroke Paternal Uncle    •  "Malig Hyperthermia Neg Hx        ROS: 14 point review of systems was performed and was negative except for documented findings in HPI and today's encounter.     Allergies:   Allergies   Allergen Reactions   • Contrast Dye (Echo Or Unknown Ct/Mr) Hives, Itching and Other (See Comments)     \"felt like something in my throat.\"     Constitutional:  Denies fever, shaking or chills   Eyes:  Denies change in visual acuity   HENT:  Denies nasal congestion or sore throat   Respiratory:  Denies cough or shortness of breath   Cardiovascular:  Denies chest pain or severe LE edema   GI:  Denies abdominal pain, nausea, vomiting, bloody stools or diarrhea   Musculoskeletal:  Numbness, tingling, or loss of motor function only as noted above in history of present illness.  : Denies painful urination or hematuria  Integument:  Denies rash, lesion or ulceration   Neurologic:  Denies headache or focal weakness  Endocrine:  Denies lymphadenopathy  Psych:  Denies confusion or change in mental status   Hem:  Denies active bleeding      Physical Exam: 78 y.o. male  Wt Readings from Last 3 Encounters:   12/15/22 79.4 kg (175 lb 1.6 oz)   08/08/22 80.3 kg (177 lb)   01/18/22 80.7 kg (178 lb)       There is no height or weight on file to calculate BMI.    There were no vitals filed for this visit.  Vital signs reviewed.   General Appearance:    Alert, cooperative, in no acute distress                    Ortho exam      Exams of the knees are unchanged    Exam of the right hip specifically he has good internal and external rotation he has good flexion he has a negative Stinchfield no obvious radicular symptoms  X-rays AP of the pelvis lateral right hip were taken to evaluate his complaints have no comparative films he is maybe some very mild changes but not significant he does have some lower lumbar spine degenerative changes    Assessment: Bilateral knee pain this is degenerative in origin plan is to proceed with Durolane as far as his hip " goes it sounds like more trochanteric bursitis but he currently is asymptomatic with no palpable tenderness    Plan: Plan is for him to progress his activities we will inject his knees.  If his hip pain returns and he has palpable tenderness on the lateral aspect right over the trochanter I am happy to inject that if he has recurrence of his hip pain but no tenderness there I will have him see one of our hip specialist  Follow up as indicated.  Ice, elevate, and rest as needed.  Discussed conservative measures of pain control including ice, bracing.  Also talked about the importance of strengthening and maintaining ideal body weight    Kathrin Garza M.D.  Large Joint Arthrocentesis: R knee  Date/Time: 5/18/2023 11:15 AM  Consent given by: patient  Site marked: site marked  Timeout: Immediately prior to procedure a time out was called to verify the correct patient, procedure, equipment, support staff and site/side marked as required   Supporting Documentation  Indications: pain, joint swelling and diagnostic evaluation   Procedure Details  Location: knee - R knee  Preparation: Patient was prepped and draped in the usual sterile fashion  Needle gauge: 21G.  Medications administered: 2 mL lidocaine (cardiac); 32 mg Triamcinolone Acetonide 32 MG  Patient tolerance: patient tolerated the procedure well with no immediate complications    Large Joint Arthrocentesis: L knee  Date/Time: 5/18/2023 11:15 AM  Consent given by: patient  Site marked: site marked  Timeout: Immediately prior to procedure a time out was called to verify the correct patient, procedure, equipment, support staff and site/side marked as required   Supporting Documentation  Indications: pain   Procedure Details  Location: knee - L knee  Preparation: Patient was prepped and draped in the usual sterile fashion  Needle gauge: 21G.  Medications administered: 4 mL lidocaine PF 2% 2 %; 32 mg Triamcinolone Acetonide 32 MG  Patient tolerance: patient tolerated  the procedure well with no immediate complications

## 2023-05-18 ENCOUNTER — CLINICAL SUPPORT (OUTPATIENT)
Dept: ORTHOPEDIC SURGERY | Facility: CLINIC | Age: 78
End: 2023-05-18
Payer: MEDICARE

## 2023-05-18 VITALS — HEIGHT: 67 IN | TEMPERATURE: 97.8 F | WEIGHT: 175 LBS | BODY MASS INDEX: 27.47 KG/M2

## 2023-05-18 DIAGNOSIS — M25.551 RIGHT HIP PAIN: Primary | ICD-10-CM

## 2023-05-18 DIAGNOSIS — M17.0 PRIMARY OSTEOARTHRITIS OF BOTH KNEES: ICD-10-CM

## 2023-05-18 RX ADMIN — LIDOCAINE HYDROCHLORIDE 4 ML: 20 INJECTION, SOLUTION EPIDURAL; INFILTRATION; INTRACAUDAL; PERINEURAL at 11:15

## 2023-05-19 RX ORDER — LIDOCAINE HYDROCHLORIDE 20 MG/ML
4 INJECTION, SOLUTION EPIDURAL; INFILTRATION; INTRACAUDAL; PERINEURAL
Status: COMPLETED | OUTPATIENT
Start: 2023-05-18 | End: 2023-05-18

## 2023-08-21 ENCOUNTER — TELEPHONE (OUTPATIENT)
Dept: ORTHOPEDIC SURGERY | Facility: CLINIC | Age: 78
End: 2023-08-21

## 2023-08-21 NOTE — TELEPHONE ENCOUNTER
Caller: Jenaro Haji Jr.    Relationship to patient: Self    Best call back number: [phone]    Chief complaint: NICOLAS KNEE    Type of visit: INJ     Requested date: SOONER THAN CURRENT APPT    If rescheduling, when is the original appointment: 9/21/23     Additional notes:

## 2023-08-29 ENCOUNTER — CLINICAL SUPPORT (OUTPATIENT)
Dept: ORTHOPEDIC SURGERY | Facility: CLINIC | Age: 78
End: 2023-08-29
Payer: MEDICARE

## 2023-08-29 VITALS — WEIGHT: 176.6 LBS | BODY MASS INDEX: 28.38 KG/M2 | TEMPERATURE: 97.9 F | HEIGHT: 66 IN

## 2023-08-29 DIAGNOSIS — M25.562 CHRONIC PAIN OF BOTH KNEES: Primary | ICD-10-CM

## 2023-08-29 DIAGNOSIS — G89.29 CHRONIC PAIN OF BOTH KNEES: Primary | ICD-10-CM

## 2023-08-29 DIAGNOSIS — M25.561 CHRONIC PAIN OF BOTH KNEES: Primary | ICD-10-CM

## 2023-08-29 RX ORDER — LIDOCAINE HYDROCHLORIDE 10 MG/ML
2 INJECTION, SOLUTION EPIDURAL; INFILTRATION; INTRACAUDAL; PERINEURAL
Status: COMPLETED | OUTPATIENT
Start: 2023-08-29 | End: 2023-08-29

## 2023-08-29 RX ORDER — METHYLPREDNISOLONE ACETATE 80 MG/ML
1 INJECTION, SUSPENSION INTRA-ARTICULAR; INTRALESIONAL; INTRAMUSCULAR; SOFT TISSUE
Status: COMPLETED | OUTPATIENT
Start: 2023-08-29 | End: 2023-08-29

## 2023-08-29 RX ADMIN — LIDOCAINE HYDROCHLORIDE 2 ML: 10 INJECTION, SOLUTION EPIDURAL; INFILTRATION; INTRACAUDAL; PERINEURAL at 16:34

## 2023-08-29 RX ADMIN — METHYLPREDNISOLONE ACETATE 1 ML: 80 INJECTION, SUSPENSION INTRA-ARTICULAR; INTRALESIONAL; INTRAMUSCULAR; SOFT TISSUE at 16:34

## 2023-08-29 NOTE — PROGRESS NOTES
"  Patient: Jenaro Haji Jr.  YOB: 1945  Date of Service: 8/29/2023    Chief Complaints: Bilateral knee pain    Subjective:    History of Present Illness: Pt is seen in the office today with complaints of bilateral knee pain right now his left is worse than right he states he has intermittent times where it catches and locks and he is miserable he states and there are times where he has no pain on the left knee the right knee hurts as well but not as bad as the left he does have type 2 diabetes        Allergies:   Allergies   Allergen Reactions    Contrast Dye (Echo Or Unknown Ct/Mr) Hives, Itching and Other (See Comments)     \"felt like something in my throat.\"       Medications:   Home Medications:  Current Outpatient Medications on File Prior to Visit   Medication Sig    aspirin 81 MG EC tablet Take 1 tablet by mouth Daily.    atorvastatin (LIPITOR) 40 MG tablet Take 1 tablet by mouth Daily.    glipiZIDE (GLUCOTROL) 10 MG tablet TAKE 1 TABLET TWICE DAILY    levothyroxine (SYNTHROID, LEVOTHROID) 75 MCG tablet Take 1 tablet by mouth Daily.    liothyronine (CYTOMEL) 5 MCG tablet Take 1 tablet by mouth Daily.    losartan-hydrochlorothiazide (HYZAAR) 100-25 MG per tablet Take 1 tablet by mouth Daily.    metFORMIN (GLUCOPHAGE) 1000 MG tablet TAKE 1 TABLET TWICE DAILY WITH MEALS    multivitamin with minerals tablet tablet Take 1 tablet by mouth Daily.    RELION CONFIRM/MICRO TEST test strip CHECK BLOOD SUGAR ONCE DAILY (Patient not taking: Reported on 5/18/2023)    VITAMIN B COMPLEX-C PO Take  by mouth.    vitamin B-6 (PYRIDOXINE) 50 MG tablet Take 100 mg by mouth Daily.     No current facility-administered medications on file prior to visit.     Current Medications:  Scheduled Meds:  Continuous Infusions:No current facility-administered medications for this visit.    PRN Meds:.    I have reviewed the patient's medical history in detail and updated the computerized patient record.  Review and " summarization of old records include:    Past Medical History:   Diagnosis Date    Arthritis     Cancer     skin    Cataracts, bilateral     Chronic kidney disease     kidney stones    Diabetes mellitus     Hyperlipidemia     Hypertension     Kidney stone     Lumbar spinal stenosis     Nocturia     Non-insulin dependent type 2 diabetes mellitus     Pain in both wrists     Shortness of breath     Thyroid disease     hypothryoid        Past Surgical History:   Procedure Laterality Date    COLONOSCOPY  03/18/2011    COLONOSCOPY N/A 11/17/2017    Procedure: COLONOSCOPY TO CECUM/TI WITH POLYPECTOMY ( COLD BX);  Surgeon: Kenan Galindo MD;  Location: [facility] ENDOSCOPY;  Service:     COLONOSCOPY N/A 8/8/2022    Procedure: COLONOSCOPY into cecum and terminal ileum;  Surgeon: Kenan Galindo MD;  Location: [facility] ENDOSCOPY;  Service: Gastroenterology;  Laterality: N/A;  pre: personal hx of colon polyps   post: diverticulosis     HERNIA REPAIR      INGUINAL HERNIA REPAIR      QUADRICEPS TENDON REPAIR Right 1/18/2018    Procedure: QUADRICEPS TENDON REPAIR;  Surgeon: Brett Mclaughlin MD;  Location: [facility] OR;  Service:     SKIN CANCER EXCISION Left     hand        Social History     Occupational History    Not on file   Tobacco Use    Smoking status: Former    Smokeless tobacco: Never   Vaping Use    Vaping Use: Never used   Substance and Sexual Activity    Alcohol use: Yes     Comment: weekly - moderate    Drug use: No    Sexual activity: Defer      Social History     Social History Narrative    Not on file        Family History   Problem Relation Age of Onset    Cancer Mother     Diabetes Mother     Aortic aneurysm Father         Abdominal    Coronary artery disease Father     Heart attack Father     Hypertension Father     Sudden death Father     Stroke Paternal Uncle     Malig Hyperthermia Neg Hx        ROS: 14 point review of systems was performed and was negative except for documented findings in HPI  "and today's encounter.     Allergies:   Allergies   Allergen Reactions    Contrast Dye (Echo Or Unknown Ct/Mr) Hives, Itching and Other (See Comments)     \"felt like something in my throat.\"     Constitutional:  Denies fever, shaking or chills   Eyes:  Denies change in visual acuity   HENT:  Denies nasal congestion or sore throat   Respiratory:  Denies cough or shortness of breath   Cardiovascular:  Denies chest pain or severe LE edema   GI:  Denies abdominal pain, nausea, vomiting, bloody stools or diarrhea   Musculoskeletal:  Numbness, tingling, or loss of motor function only as noted above in history of present illness.  : Denies painful urination or hematuria  Integument:  Denies rash, lesion or ulceration   Neurologic:  Denies headache or focal weakness  Endocrine:  Denies lymphadenopathy  Psych:  Denies confusion or change in mental status   Hem:  Denies active bleeding      Physical Exam: 78 y.o. male  Wt Readings from Last 3 Encounters:   05/18/23 79.4 kg (175 lb)   12/15/22 79.4 kg (175 lb 1.6 oz)   08/08/22 80.3 kg (177 lb)       There is no height or weight on file to calculate BMI.    There were no vitals filed for this visit.  Vital signs reviewed.   General Appearance:    Alert, cooperative, in no acute distress                    Ortho exam    Physical exam of the left knee reveals no effusion no redness.  The patient does have tenderness about the medial joint line.  No tenderness about the lateral joint line.  A negative bounce home and a positive medial Torito.  There is some pain medially  with a lateral Torito.  Patient has a stable ligamentous exam.  Quads are reasonable and symmetric bilaterally.  Calf is soft and nontender.  There is no overlying skin changes no lymphedema lymphadenopathy.  Patient has good hip range of motion full symmetric and asymptomatic and a normal ankle exam.   Physical exam of the right  knee reveals no effusion no redness.  The patient does have tenderness about " the medial l joint line.  No tenderness about the lateral l joint line.  A negative bounce home and a positive l medial Torito.    Patient has a stable ligamentous exam.  The patient has a negative Lachman and negative anterior drawer and a negative pivot shift.  Quads are reasonable and symmetric bilaterally.  Calf is soft and nontender.  There is no overlying skin changes no lymphedema lymphadenopathy.  Patient has good hip range of motion full symmetric and asymptomatic and a normal ankle exam.  She has good distal pulses and sensation distally is intact              Assessment: Bilateral knee pain on the left he did have some changes medial femoral condyle and x-rays done in May could have a meniscus tear that is causing his intermittent symptoms he could have a piece of cartilage that is flaking on that condyle that is bothering him.  I think an injection is quite reasonable today we talked about activity modification if his symptoms persist in a mechanical fashion despite this I would pursue an MRI he will give me a call    Plan:   Follow up as indicated.  Ice, elevate, and rest as needed.  Discussed conservative measures of pain control including ice, bracing.  Also talked about the importance of strengthening and maintaining ideal body weight  As far as the right knee goes I still little concerned about meniscal pathology plan again is to proceed with injection should he fail this we will pursue other means of testing  Kathrin Garza M.D.    Large Joint Arthrocentesis: R knee  Date/Time: 8/29/2023 4:34 PM  Consent given by: patient  Site marked: site marked  Timeout: Immediately prior to procedure a time out was called to verify the correct patient, procedure, equipment, support staff and site/side marked as required   Supporting Documentation  Indications: pain, joint swelling and diagnostic evaluation   Procedure Details  Location: knee - R knee  Preparation: Patient was prepped and draped in the usual  sterile fashion  Needle gauge: 21G.  Medications administered: 1 mL methylPREDNISolone acetate 80 MG/ML; 2 mL lidocaine PF 1% 1 %  Patient tolerance: patient tolerated the procedure well with no immediate complications      Large Joint Arthrocentesis: L knee  Date/Time: 8/29/2023 4:34 PM  Consent given by: patient  Site marked: site marked  Timeout: Immediately prior to procedure a time out was called to verify the correct patient, procedure, equipment, support staff and site/side marked as required   Supporting Documentation  Indications: pain   Procedure Details  Location: knee - L knee  Preparation: Patient was prepped and draped in the usual sterile fashion  Needle gauge: 21G.  Medications administered: 1 mL methylPREDNISolone acetate 80 MG/ML; 2 mL lidocaine PF 1% 1 %  Patient tolerance: patient tolerated the procedure well with no immediate complications

## 2023-10-04 ENCOUNTER — TELEPHONE (OUTPATIENT)
Dept: ORTHOPEDIC SURGERY | Facility: CLINIC | Age: 78
End: 2023-10-04
Payer: MEDICARE

## 2023-10-04 NOTE — TELEPHONE ENCOUNTER
Call returned to the patient.  Patient states that it was not related to him at all it was more about getting his wife an appointment to be seen for her hip.  Will check with the office about scheduling for his wife to have an appointment to be seen for her hip

## 2023-10-04 NOTE — TELEPHONE ENCOUNTER
----- Message from Kathrin Garza MD sent at 10/3/2023 12:08 PM EDT -----  Regarding: FW: Conversation with Jenaro's wife, Elsi Tidwell  Contact: [phone]        ----- Message -----  From: Ana Luisa Lara  Sent: 10/3/2023  11:33 AM EDT  To: Kathrin Garza MD  Subject: Conversation with Vas wife, Elsi Tidwell       Please review pt message.   Last OV: 8/29/23, B knee      ----- Message -----  From: Jenaro Haji Jr.  Sent: 10/3/2023   9:58 AM EDT  To: [facility]  Subject: Conversation with Vas wifeElsi       Need to ask questions about hip replacement.

## 2023-12-06 NOTE — PROGRESS NOTES
"  Patient: Jenaro Haji Jr.  YOB: 1945  Date of Service: 12/6/2023    Chief Complaints: Bilateral knee and right hip pain    Subjective:    History of Present Illness: Pt is seen in the office today with complaints of bilateral knee pain he has known degenerative changes he does get intermittent injections and does great with those.  His biggest complaint today is that of what he says is right hip pain its lateral aspect of the hip it radiates down to the medial thighs a little bit in the groin occasionally to the knee different things bother him such as activity standing leaning forward we did take an x-ray last visit which shows some mild narrowing of his hip joint space        Allergies:   Allergies   Allergen Reactions    Iodinated Contrast Media Unknown (See Comments)    Contrast Dye (Echo Or Unknown Ct/Mr) Hives, Itching and Other (See Comments)     \"felt like something in my throat.\"       Medications:   Home Medications:  Current Outpatient Medications on File Prior to Visit   Medication Sig    aspirin 81 MG EC tablet Take 1 tablet by mouth Daily.    atorvastatin (LIPITOR) 40 MG tablet Take 1 tablet by mouth Daily.    glipiZIDE (GLUCOTROL) 10 MG tablet TAKE 1 TABLET TWICE DAILY    levothyroxine (SYNTHROID, LEVOTHROID) 75 MCG tablet Take 1 tablet by mouth Daily.    liothyronine (CYTOMEL) 5 MCG tablet Take 1 tablet by mouth Daily.    losartan-hydrochlorothiazide (HYZAAR) 100-25 MG per tablet Take 1 tablet by mouth Daily.    metFORMIN (GLUCOPHAGE) 1000 MG tablet TAKE 1 TABLET TWICE DAILY WITH MEALS    multivitamin with minerals tablet tablet Take 1 tablet by mouth Daily.    RELION CONFIRM/MICRO TEST test strip CHECK BLOOD SUGAR ONCE DAILY    VITAMIN B COMPLEX-C PO Take  by mouth.    vitamin B-6 (PYRIDOXINE) 50 MG tablet Take 100 mg by mouth Daily.     No current facility-administered medications on file prior to visit.     Current Medications:  Scheduled Meds:  Continuous Infusions:No current " facility-administered medications for this visit.    PRN Meds:.    I have reviewed the patient's medical history in detail and updated the computerized patient record.  Review and summarization of old records include:    Past Medical History:   Diagnosis Date    Arthritis     Cancer     skin    Cataracts, bilateral     Chronic kidney disease     kidney stones    Diabetes mellitus     Hyperlipidemia     Hypertension     Kidney stone     Lumbar spinal stenosis     Nocturia     Non-insulin dependent type 2 diabetes mellitus     Pain in both wrists     Shortness of breath     Thyroid disease     hypothryoid        Past Surgical History:   Procedure Laterality Date    COLONOSCOPY  03/18/2011    COLONOSCOPY N/A 11/17/2017    Procedure: COLONOSCOPY TO CECUM/TI WITH POLYPECTOMY ( COLD BX);  Surgeon: Kenan Galindo MD;  Location: [facility] ENDOSCOPY;  Service:     COLONOSCOPY N/A 8/8/2022    Procedure: COLONOSCOPY into cecum and terminal ileum;  Surgeon: Kenan Galindo MD;  Location: [facility] ENDOSCOPY;  Service: Gastroenterology;  Laterality: N/A;  pre: personal hx of colon polyps   post: diverticulosis     HERNIA REPAIR      INGUINAL HERNIA REPAIR      QUADRICEPS TENDON REPAIR Right 1/18/2018    Procedure: QUADRICEPS TENDON REPAIR;  Surgeon: Brett Mclaughlin MD;  Location: [facility] OR;  Service:     SKIN CANCER EXCISION Left     hand        Social History     Occupational History    Not on file   Tobacco Use    Smoking status: Former    Smokeless tobacco: Never   Vaping Use    Vaping Use: Never used   Substance and Sexual Activity    Alcohol use: Yes     Comment: weekly - moderate    Drug use: No    Sexual activity: Defer      Social History     Social History Narrative    Not on file        Family History   Problem Relation Age of Onset    Cancer Mother     Diabetes Mother     Aortic aneurysm Father         Abdominal    Coronary artery disease Father     Heart attack Father     Hypertension Father      "Sudden death Father     Stroke Paternal Uncle     Roxana Hyperthermia Neg Hx        ROS: 14 point review of systems was performed and was negative except for documented findings in HPI and today's encounter.     Allergies:   Allergies   Allergen Reactions    Iodinated Contrast Media Unknown (See Comments)    Contrast Dye (Echo Or Unknown Ct/Mr) Hives, Itching and Other (See Comments)     \"felt like something in my throat.\"     Constitutional:  Denies fever, shaking or chills   Eyes:  Denies change in visual acuity   HENT:  Denies nasal congestion or sore throat   Respiratory:  Denies cough or shortness of breath   Cardiovascular:  Denies chest pain or severe LE edema   GI:  Denies abdominal pain, nausea, vomiting, bloody stools or diarrhea   Musculoskeletal:  Numbness, tingling, or loss of motor function only as noted above in history of present illness.  : Denies painful urination or hematuria  Integument:  Denies rash, lesion or ulceration   Neurologic:  Denies headache or focal weakness  Endocrine:  Denies lymphadenopathy  Psych:  Denies confusion or change in mental status   Hem:  Denies active bleeding      Physical Exam: 78 y.o. male  Wt Readings from Last 3 Encounters:   08/29/23 80.1 kg (176 lb 9.6 oz)   05/18/23 79.4 kg (175 lb)   12/15/22 79.4 kg (175 lb 1.6 oz)       There is no height or weight on file to calculate BMI.    There were no vitals filed for this visit.  Vital signs reviewed.   General Appearance:    Alert, cooperative, in no acute distress                    Ortho exam      Exam of his knees are unchanged    Exam of his right hip he has good internal and external rotation a little less than the left side to about 50 of each with no symptoms I do not get a straight leg raise nor Lasegue's he appears neurologically intact    I did review his x-rays of his hip he has some mild narrowing of his hip joint on the right           Assessment: Bilateral knee pain which is degenerative in origin I " think injections are very reasonable for him he gets relief from them.  As far as his hip goes I I am thinking this might be radicular.  He does have some narrowing of his hip joint some groin pain I think an ultrasound-guided hip joint injection would be a great diagnostic and therapeutic tool.  If after that we think it is spine I will have him see Vivian    Plan:   Follow up as indicated.  Ice, elevate, and rest as needed.  Discussed conservative measures of pain control including ice, bracing.  Also talked about the importance of strengthening  Kathrin Garza M.D.    Large Joint Arthrocentesis: R knee  Date/Time: 12/7/2023 10:55 AM  Consent given by: patient  Site marked: site marked  Timeout: Immediately prior to procedure a time out was called to verify the correct patient, procedure, equipment, support staff and site/side marked as required   Supporting Documentation  Indications: pain, joint swelling and diagnostic evaluation   Procedure Details  Location: knee - R knee  Preparation: Patient was prepped and draped in the usual sterile fashion  Needle gauge: 21G.  Medications administered: 1 mL methylPREDNISolone acetate 80 MG/ML; 2 mL lidocaine PF 1% 1 %  Patient tolerance: patient tolerated the procedure well with no immediate complications      Large Joint Arthrocentesis: L knee  Date/Time: 12/7/2023 10:55 AM  Consent given by: patient  Site marked: site marked  Timeout: Immediately prior to procedure a time out was called to verify the correct patient, procedure, equipment, support staff and site/side marked as required   Supporting Documentation  Indications: pain   Procedure Details  Location: knee - L knee  Preparation: Patient was prepped and draped in the usual sterile fashion  Needle gauge: 21G.  Medications administered: 1 mL methylPREDNISolone acetate 80 MG/ML; 2 mL lidocaine PF 1% 1 %  Patient tolerance: patient tolerated the procedure well with no immediate complications

## 2023-12-07 ENCOUNTER — CLINICAL SUPPORT (OUTPATIENT)
Dept: ORTHOPEDIC SURGERY | Facility: CLINIC | Age: 78
End: 2023-12-07
Payer: MEDICARE

## 2023-12-07 VITALS — TEMPERATURE: 98.6 F | BODY MASS INDEX: 28.17 KG/M2 | HEIGHT: 66 IN | WEIGHT: 175.3 LBS

## 2023-12-07 DIAGNOSIS — M17.0 PRIMARY OSTEOARTHRITIS OF BOTH KNEES: ICD-10-CM

## 2023-12-07 DIAGNOSIS — M25.551 RIGHT HIP PAIN: Primary | ICD-10-CM

## 2023-12-07 RX ORDER — METHYLPREDNISOLONE ACETATE 80 MG/ML
1 INJECTION, SUSPENSION INTRA-ARTICULAR; INTRALESIONAL; INTRAMUSCULAR; SOFT TISSUE
Status: COMPLETED | OUTPATIENT
Start: 2023-12-07 | End: 2023-12-07

## 2023-12-07 RX ORDER — LIDOCAINE HYDROCHLORIDE 10 MG/ML
2 INJECTION, SOLUTION EPIDURAL; INFILTRATION; INTRACAUDAL; PERINEURAL
Status: COMPLETED | OUTPATIENT
Start: 2023-12-07 | End: 2023-12-07

## 2023-12-07 RX ADMIN — LIDOCAINE HYDROCHLORIDE 2 ML: 10 INJECTION, SOLUTION EPIDURAL; INFILTRATION; INTRACAUDAL; PERINEURAL at 10:55

## 2023-12-07 RX ADMIN — METHYLPREDNISOLONE ACETATE 1 ML: 80 INJECTION, SUSPENSION INTRA-ARTICULAR; INTRALESIONAL; INTRAMUSCULAR; SOFT TISSUE at 10:55

## 2024-01-08 ENCOUNTER — OFFICE VISIT (OUTPATIENT)
Dept: SPORTS MEDICINE | Facility: CLINIC | Age: 79
End: 2024-01-08
Payer: MEDICARE

## 2024-01-08 VITALS
HEIGHT: 66 IN | BODY MASS INDEX: 28.12 KG/M2 | HEART RATE: 81 BPM | SYSTOLIC BLOOD PRESSURE: 128 MMHG | WEIGHT: 175 LBS | DIASTOLIC BLOOD PRESSURE: 64 MMHG | OXYGEN SATURATION: 98 % | TEMPERATURE: 98.4 F

## 2024-01-08 DIAGNOSIS — M16.11 PRIMARY OSTEOARTHRITIS OF RIGHT HIP: Primary | ICD-10-CM

## 2024-01-08 PROCEDURE — 3078F DIAST BP <80 MM HG: CPT | Performed by: FAMILY MEDICINE

## 2024-01-08 PROCEDURE — 99202 OFFICE O/P NEW SF 15 MIN: CPT | Performed by: FAMILY MEDICINE

## 2024-01-08 PROCEDURE — 3074F SYST BP LT 130 MM HG: CPT | Performed by: FAMILY MEDICINE

## 2024-01-08 NOTE — PROGRESS NOTES
"Jenaro is a 78 y.o. year old male     Chief Complaint   Patient presents with    Injections     RIGHT HIP- Patient was referred by Dr. Kathrin Garza for US guided hip injection.        History of Present Illness  HPI   Patient was referred today for right hip injection by Dr. Garza for arthritis.  He tells him today that his pain has actually been absent for some time and he questions if he should have the injection.  He states he has intermittent pain that has been worse with physical activity working outside, but he has been more sedentary recently and his pain has been absent.      Review of Systems    /64 (BP Location: Right arm, Patient Position: Sitting, Cuff Size: Adult)   Pulse 81   Temp 98.4 °F (36.9 °C) (Temporal)   Ht 167.6 cm (66\")   Wt 79.4 kg (175 lb)   SpO2 98%   BMI 28.25 kg/m²      Physical Exam    Vital signs reviewed.   General: No acute distress.      MSK Exam:  Ortho Exam    Procedures     Diagnoses and all orders for this visit:    Primary osteoarthritis of right hip    Discussed considerations with the patient and his wife in detail.  In the absence of pain, he could possibly have some benefits from injecting this because of referred pain with his knee arthritis.  However I think it is reasonable for him to wait to the pain returns and we can work him in promptly for injection as needed.  He prefers this approach and will let me know when he needs any help.    Total time: 15 minutes. This includes time spent with the patient, but also time spent before the visit reviewing the chart and time after the visit documenting the visit, reviewing labs, imaging studies, etc. This is in addition to/separate from any documented procedures performed.        EMR Dragon/Transcription disclaimer:    Much of this encounter note is an electronic transcription/translation of spoken language to printed text.  The electronic translation of spoken language may permit erroneous, or at times, " nonsensical words or phrases to be inadvertently transcribed.  Although I have reviewed the note for such errors some may still exist.

## 2024-01-09 ENCOUNTER — PATIENT ROUNDING (BHMG ONLY) (OUTPATIENT)
Dept: SPORTS MEDICINE | Facility: CLINIC | Age: 79
End: 2024-01-09
Payer: MEDICARE

## 2024-01-09 NOTE — PROGRESS NOTES
January 9, 2024    A Verisim Message has been sent to the patient for PATIENT ROUNDING with [facility] [place]

## 2024-03-28 ENCOUNTER — OFFICE VISIT (OUTPATIENT)
Dept: ORTHOPEDIC SURGERY | Facility: CLINIC | Age: 79
End: 2024-03-28
Payer: MEDICARE

## 2024-03-28 VITALS — BODY MASS INDEX: 30.22 KG/M2 | HEIGHT: 65 IN | TEMPERATURE: 98.4 F | WEIGHT: 181.4 LBS

## 2024-03-28 DIAGNOSIS — S83.241D ACUTE MEDIAL MENISCUS TEAR OF RIGHT KNEE, SUBSEQUENT ENCOUNTER: Primary | ICD-10-CM

## 2024-03-28 DIAGNOSIS — M17.12 PRIMARY OSTEOARTHRITIS OF LEFT KNEE: ICD-10-CM

## 2024-03-28 RX ORDER — METHYLPREDNISOLONE ACETATE 80 MG/ML
1 INJECTION, SUSPENSION INTRA-ARTICULAR; INTRALESIONAL; INTRAMUSCULAR; SOFT TISSUE
Status: COMPLETED | OUTPATIENT
Start: 2024-03-28 | End: 2024-03-28

## 2024-03-28 RX ORDER — LIDOCAINE HYDROCHLORIDE 10 MG/ML
2 INJECTION, SOLUTION EPIDURAL; INFILTRATION; INTRACAUDAL; PERINEURAL
Status: COMPLETED | OUTPATIENT
Start: 2024-03-28 | End: 2024-03-28

## 2024-03-28 RX ORDER — FINASTERIDE 5 MG/1
5 TABLET, FILM COATED ORAL
COMMUNITY
Start: 2024-03-13

## 2024-03-28 RX ADMIN — LIDOCAINE HYDROCHLORIDE 2 ML: 10 INJECTION, SOLUTION EPIDURAL; INFILTRATION; INTRACAUDAL; PERINEURAL at 09:50

## 2024-03-28 RX ADMIN — METHYLPREDNISOLONE ACETATE 1 ML: 80 INJECTION, SUSPENSION INTRA-ARTICULAR; INTRALESIONAL; INTRAMUSCULAR; SOFT TISSUE at 09:51

## 2024-03-28 RX ADMIN — METHYLPREDNISOLONE ACETATE 1 ML: 80 INJECTION, SUSPENSION INTRA-ARTICULAR; INTRALESIONAL; INTRAMUSCULAR; SOFT TISSUE at 09:50

## 2024-03-28 RX ADMIN — LIDOCAINE HYDROCHLORIDE 2 ML: 10 INJECTION, SOLUTION EPIDURAL; INFILTRATION; INTRACAUDAL; PERINEURAL at 09:51

## 2024-03-28 NOTE — PROGRESS NOTES
"  Patient: Jenaro Haji Jr.  YOB: 1945  Date of Service: 3/28/2024    Chief Complaints: Bilateral knee pain    Subjective:    History of Present Illness: Pt is seen in the office today with complaints of bilateral knee pain they both are bad the left is worse than the right and the left he does have degenerative changes her last x-ray on the left was in May of last year he had marked narrowing of the medial compartment and what appeared to be an AVN or at least an osteochondral defect on the medial femoral condyle he states his right knee hurts just as bad states the shots do give him some temporary relief he is done exercises        Allergies:   Allergies   Allergen Reactions    Iodinated Contrast Media Unknown (See Comments)    Contrast Dye (Echo Or Unknown Ct/Mr) Hives, Itching and Other (See Comments)     \"felt like something in my throat.\"       Medications:   Home Medications:  Current Outpatient Medications on File Prior to Visit   Medication Sig    aspirin 81 MG EC tablet Take 1 tablet by mouth Daily.    atorvastatin (LIPITOR) 40 MG tablet Take 1 tablet by mouth Daily.    glipiZIDE (GLUCOTROL) 10 MG tablet TAKE 1 TABLET TWICE DAILY    levothyroxine (SYNTHROID, LEVOTHROID) 75 MCG tablet Take 1 tablet by mouth Daily.    liothyronine (CYTOMEL) 5 MCG tablet Take 1 tablet by mouth Daily.    losartan-hydrochlorothiazide (HYZAAR) 100-25 MG per tablet Take 1 tablet by mouth Daily.    metFORMIN (GLUCOPHAGE) 1000 MG tablet TAKE 1 TABLET TWICE DAILY WITH MEALS    multivitamin with minerals tablet tablet Take 1 tablet by mouth Daily.    RELION CONFIRM/MICRO TEST test strip CHECK BLOOD SUGAR ONCE DAILY    VITAMIN B COMPLEX-C PO Take  by mouth.    vitamin B-6 (PYRIDOXINE) 50 MG tablet Take 100 mg by mouth Daily.     No current facility-administered medications on file prior to visit.     Current Medications:  Scheduled Meds:  Continuous Infusions:No current facility-administered medications for this " visit.    PRN Meds:.    I have reviewed the patient's medical history in detail and updated the computerized patient record.  Review and summarization of old records include:    Past Medical History:   Diagnosis Date    Arthritis     Cancer     skin    Cataracts, bilateral     Chronic kidney disease     kidney stones    Diabetes mellitus     ED (erectile dysfunction) 11/23/2016    Hyperlipidemia     Hypertension     Kidney stone     Lumbar spinal stenosis     Nocturia     Non-insulin dependent type 2 diabetes mellitus     Pain in both wrists     Shortness of breath     Thyroid disease     hypothryoid        Past Surgical History:   Procedure Laterality Date    CATARACT EXTRACTION Right 01/04/2024    COLONOSCOPY  03/18/2011    COLONOSCOPY N/A 11/17/2017    Procedure: COLONOSCOPY TO CECUM/TI WITH POLYPECTOMY ( COLD BX);  Surgeon: Kenan Galindo MD;  Location: [facility] ENDOSCOPY;  Service:     COLONOSCOPY N/A 08/08/2022    Procedure: COLONOSCOPY into cecum and terminal ileum;  Surgeon: Kenan Galindo MD;  Location: [facility] ENDOSCOPY;  Service: Gastroenterology;  Laterality: N/A;  pre: personal hx of colon polyps   post: diverticulosis     HERNIA REPAIR      INGUINAL HERNIA REPAIR      QUADRICEPS TENDON REPAIR Right 01/18/2018    Procedure: QUADRICEPS TENDON REPAIR;  Surgeon: Brett Mclaughlin MD;  Location: [facility] OR;  Service:     SKIN CANCER EXCISION Left     hand        Social History     Occupational History    Not on file   Tobacco Use    Smoking status: Former    Smokeless tobacco: Never   Vaping Use    Vaping status: Never Used   Substance and Sexual Activity    Alcohol use: Yes     Comment: weekly - moderate    Drug use: No    Sexual activity: Defer      Social History     Social History Narrative    Not on file        Family History   Problem Relation Age of Onset    Cancer Mother     Diabetes Mother     Aortic aneurysm Father         Abdominal    Coronary artery disease Father     Heart  "attack Father     Hypertension Father     Sudden death Father     Stroke Paternal Uncle     Malig Hyperthermia Neg Hx        ROS: 14 point review of systems was performed and was negative except for documented findings in HPI and today's encounter.     Allergies:   Allergies   Allergen Reactions    Iodinated Contrast Media Unknown (See Comments)    Contrast Dye (Echo Or Unknown Ct/Mr) Hives, Itching and Other (See Comments)     \"felt like something in my throat.\"     Constitutional:  Denies fever, shaking or chills   Eyes:  Denies change in visual acuity   HENT:  Denies nasal congestion or sore throat   Respiratory:  Denies cough or shortness of breath   Cardiovascular:  Denies chest pain or severe LE edema   GI:  Denies abdominal pain, nausea, vomiting, bloody stools or diarrhea   Musculoskeletal:  Numbness, tingling, or loss of motor function only as noted above in history of present illness.  : Denies painful urination or hematuria  Integument:  Denies rash, lesion or ulceration   Neurologic:  Denies headache or focal weakness  Endocrine:  Denies lymphadenopathy  Psych:  Denies confusion or change in mental status   Hem:  Denies active bleeding      Physical Exam: 79 y.o. male  Wt Readings from Last 3 Encounters:   01/08/24 79.4 kg (175 lb)   12/07/23 79.5 kg (175 lb 4.8 oz)   08/29/23 80.1 kg (176 lb 9.6 oz)       There is no height or weight on file to calculate BMI.    There were no vitals filed for this visit.  Vital signs reviewed.   General Appearance:    Alert, cooperative, in no acute distress                    Ortho exam      Physical exam of the left knee reveals no effusion, no erythema.  It mild loss of extension and full flexion  Patient has mild varus alignment.  They have mild tenderness to palpation about the medial compartment, no tenderness laterally..  The patient has a negative bounce home, negative Torito and a stable ligamentous exam.  Quad tone is reasonable and symmetric.  There are " no overlying skin changes no lymphedema no lymphadenopathy.  There is good hip range of motion which is full symmetric and asymptomatic and a normal ankle exam.        Physical exam of the right  knee reveals no effusion no redness.  The patient does have tenderness about the medial l joint line.  No tenderness about the lateral l joint line.  A negative bounce home and a positive l medial Torito.    Patient has a stable ligamentous exam.  The patient has a negative Lachman and negative anterior drawer and a negative pivot shift.  Quads are reasonable and symmetric bilaterally.  Calf is soft and nontender.  There is no overlying skin changes no lymphedema lymphadenopathy.  Patient has good hip range of motion full symmetric and asymptomatic and a normal ankle exam.  She has good distal pulses and sensation distally is intact         Assessment: Bilateral knee pain I think the left is degenerative his options on the left are injections and ultimately arthroplasty he states he just cannot do that right now his wife is requiring a lot of care and assistance on the right we have never MRI this I am suspicious of meniscal pathology reviewed the fact that his x-rays look very good.  We will go and inject the right knee today if his symptoms persist I will get an MRI    Plan:   Follow up as indicated.  Ice, elevate, and rest as needed.  Discussed conservative measures of pain control including ice, bracing.  Also talked about the importance of strengthening   Kathrin Garza M.D.    Large Joint Arthrocentesis: R knee  Date/Time: 3/28/2024 9:50 AM  Consent given by: patient  Site marked: site marked  Timeout: Immediately prior to procedure a time out was called to verify the correct patient, procedure, equipment, support staff and site/side marked as required   Supporting Documentation  Indications: pain, joint swelling and diagnostic evaluation   Procedure Details  Location: knee - R knee  Preparation: Patient was prepped  and draped in the usual sterile fashion  Needle gauge: 21G.  Medications administered: 1 mL methylPREDNISolone acetate 80 MG/ML; 2 mL lidocaine PF 1% 1 %  Patient tolerance: patient tolerated the procedure well with no immediate complications      Large Joint Arthrocentesis: L knee  Date/Time: 3/28/2024 9:51 AM  Consent given by: patient  Site marked: site marked  Timeout: Immediately prior to procedure a time out was called to verify the correct patient, procedure, equipment, support staff and site/side marked as required   Supporting Documentation  Indications: pain   Procedure Details  Location: knee - L knee  Preparation: Patient was prepped and draped in the usual sterile fashion  Needle gauge: 21G.  Medications administered: 1 mL methylPREDNISolone acetate 80 MG/ML; 2 mL lidocaine PF 1% 1 %  Patient tolerance: patient tolerated the procedure well with no immediate complications

## 2024-07-15 ENCOUNTER — OFFICE VISIT (OUTPATIENT)
Dept: ORTHOPEDIC SURGERY | Facility: CLINIC | Age: 79
End: 2024-07-15
Payer: MEDICARE

## 2024-07-15 VITALS — TEMPERATURE: 98.2 F | BODY MASS INDEX: 28.96 KG/M2 | HEIGHT: 65 IN | WEIGHT: 173.8 LBS

## 2024-07-15 DIAGNOSIS — S83.241D TEAR OF MEDIAL MENISCUS OF RIGHT KNEE, CURRENT, UNSPECIFIED TEAR TYPE, SUBSEQUENT ENCOUNTER: ICD-10-CM

## 2024-07-15 DIAGNOSIS — M17.12 PRIMARY LOCALIZED OSTEOARTHROSIS OF LEFT LOWER LEG: ICD-10-CM

## 2024-07-15 DIAGNOSIS — R52 PAIN: Primary | ICD-10-CM

## 2024-07-15 RX ADMIN — METHYLPREDNISOLONE ACETATE 1 ML: 80 INJECTION, SUSPENSION INTRA-ARTICULAR; INTRALESIONAL; INTRAMUSCULAR; SOFT TISSUE at 14:31

## 2024-07-15 RX ADMIN — LIDOCAINE HYDROCHLORIDE 2 ML: 10 INJECTION, SOLUTION EPIDURAL; INFILTRATION; INTRACAUDAL; PERINEURAL at 14:30

## 2024-07-15 RX ADMIN — LIDOCAINE HYDROCHLORIDE 2 ML: 10 INJECTION, SOLUTION EPIDURAL; INFILTRATION; INTRACAUDAL; PERINEURAL at 14:31

## 2024-07-15 RX ADMIN — METHYLPREDNISOLONE ACETATE 1 ML: 80 INJECTION, SUSPENSION INTRA-ARTICULAR; INTRALESIONAL; INTRAMUSCULAR; SOFT TISSUE at 14:30

## 2024-07-15 NOTE — PROGRESS NOTES
"  Patient: Jenaro Haji Jr.  YOB: 1945  Date of Service: 7/15/2024    Chief Complaints: Bilateral knee pain    Subjective:    History of Present Illness: Pt is seen in the office today with complaints of bilateral knee painI last saw him and March for injections of his knee he has severe degenerative changes on the left on the right he had some degenerative changes he does respond well to injections.  We have talked in the future about replacement on the left also about MRI on the right.  His wife is getting ready to have a hip replacement tomorrow so he cannot do anything at this time he would like repeat injections        Allergies:   Allergies   Allergen Reactions    Iodinated Contrast Media Unknown (See Comments)    Contrast Dye (Echo Or Unknown Ct/Mr) Hives, Itching and Other (See Comments)     \"felt like something in my throat.\"       Medications:   Home Medications:  Current Outpatient Medications on File Prior to Visit   Medication Sig    aspirin 81 MG EC tablet Take 1 tablet by mouth Daily.    atorvastatin (LIPITOR) 40 MG tablet Take 1 tablet by mouth Daily.    finasteride (PROSCAR) 5 MG tablet Take 1 tablet by mouth every night at bedtime.    glipiZIDE (GLUCOTROL) 10 MG tablet TAKE 1 TABLET TWICE DAILY    levothyroxine (SYNTHROID, LEVOTHROID) 75 MCG tablet Take 1 tablet by mouth Daily.    liothyronine (CYTOMEL) 5 MCG tablet Take 1 tablet by mouth Daily.    losartan-hydrochlorothiazide (HYZAAR) 100-25 MG per tablet Take 1 tablet by mouth Daily.    metFORMIN (GLUCOPHAGE) 1000 MG tablet TAKE 1 TABLET TWICE DAILY WITH MEALS    multivitamin with minerals tablet tablet Take 1 tablet by mouth Daily.    RELION CONFIRM/MICRO TEST test strip CHECK BLOOD SUGAR ONCE DAILY    VITAMIN B COMPLEX-C PO Take  by mouth.    vitamin B-6 (PYRIDOXINE) 50 MG tablet Take 100 mg by mouth Daily.     No current facility-administered medications on file prior to visit.     Current Medications:  Scheduled " Meds:  Continuous Infusions:No current facility-administered medications for this visit.    PRN Meds:.    I have reviewed the patient's medical history in detail and updated the computerized patient record.  Review and summarization of old records include:    Past Medical History:   Diagnosis Date    Arthritis     Cancer     skin    Cataracts, bilateral     Chronic kidney disease     kidney stones    CTS (carpal tunnel syndrome)     Diabetes mellitus     Dislocation of finger ?1970's    ED (erectile dysfunction) 11/23/2016    Hip arthrosis 2023 ????    Hyperlipidemia     Hypertension     Kidney stone     Knee sprain     Low back strain maybe    Lumbar spinal stenosis     Nocturia     Non-insulin dependent type 2 diabetes mellitus     Pain in both wrists     Shortness of breath     Tear of meniscus of knee ? 2000    Tennis elbow ? 1960's    Thyroid disease     hypothryoid        Past Surgical History:   Procedure Laterality Date    CATARACT EXTRACTION Right 01/04/2024    COLONOSCOPY  03/18/2011    COLONOSCOPY N/A 11/17/2017    Procedure: COLONOSCOPY TO CECUM/TI WITH POLYPECTOMY ( COLD BX);  Surgeon: Kenan Galindo MD;  Location: [facility] ENDOSCOPY;  Service:     COLONOSCOPY N/A 08/08/2022    Procedure: COLONOSCOPY into cecum and terminal ileum;  Surgeon: Kenan Galindo MD;  Location: [facility] ENDOSCOPY;  Service: Gastroenterology;  Laterality: N/A;  pre: personal hx of colon polyps   post: diverticulosis     HERNIA REPAIR      INGUINAL HERNIA REPAIR      KNEE SURGERY  broke Quad tendon    QUADRICEPS TENDON REPAIR Right 01/18/2018    Procedure: QUADRICEPS TENDON REPAIR;  Surgeon: Brett Mclaughlin MD;  Location: [facility] OR;  Service:     SKIN CANCER EXCISION Left     hand        Social History     Occupational History    Not on file   Tobacco Use    Smoking status: Former     Current packs/day: 0.00     Average packs/day: 1.5 packs/day for 50.0 years (75.0 ttl pk-yrs)     Types: Cigarettes     Start  "date: 1960     Quit date: 2005     Years since quittin.5    Smokeless tobacco: Never   Vaping Use    Vaping status: Never Used   Substance and Sexual Activity    Alcohol use: Yes     Alcohol/week: 4.0 standard drinks of alcohol     Types: 1 Glasses of wine, 3 Drinks containing 0.5 oz of alcohol per week     Comment: social drinking    Drug use: No    Sexual activity: Not Currently     Partners: Female      Social History     Social History Narrative    Not on file        Family History   Problem Relation Age of Onset    Cancer Mother     Diabetes Mother     Aortic aneurysm Father         Abdominal    Coronary artery disease Father     Heart attack Father     Hypertension Father     Sudden death Father     Stroke Paternal Uncle     Malig Hyperthermia Neg Hx        ROS: 14 point review of systems was performed and was negative except for documented findings in HPI and today's encounter.     Allergies:   Allergies   Allergen Reactions    Iodinated Contrast Media Unknown (See Comments)    Contrast Dye (Echo Or Unknown Ct/Mr) Hives, Itching and Other (See Comments)     \"felt like something in my throat.\"     Constitutional:  Denies fever, shaking or chills   Eyes:  Denies change in visual acuity   HENT:  Denies nasal congestion or sore throat   Respiratory:  Denies cough or shortness of breath   Cardiovascular:  Denies chest pain or severe LE edema   GI:  Denies abdominal pain, nausea, vomiting, bloody stools or diarrhea   Musculoskeletal:  Numbness, tingling, or loss of motor function only as noted above in history of present illness.  : Denies painful urination or hematuria  Integument:  Denies rash, lesion or ulceration   Neurologic:  Denies headache or focal weakness  Endocrine:  Denies lymphadenopathy  Psych:  Denies confusion or change in mental status   Hem:  Denies active bleeding      Physical Exam: 79 y.o. male  Wt Readings from Last 3 Encounters:   07/15/24 78.8 kg (173 lb 12.8 oz)   24 " 82.3 kg (181 lb 6.4 oz)   01/08/24 79.4 kg (175 lb)       Body mass index is 28.92 kg/m².    Vitals:    07/15/24 1427   Temp: 98.2 °F (36.8 °C)     Vital signs reviewed.   General Appearance:    Alert, cooperative, in no acute distress                    Ortho exam    Physical exam of the left knee reveals no effusion, no erythema.  It mild loss of extension and full flexion  Patient has mild varus alignment.  They have mild tenderness to palpation about the medial compartment, no tenderness laterally..  The patient has a negative bounce home, negative Torito and a stable ligamentous exam.  Quad tone is reasonable and symmetric.  There are no overlying skin changes no lymphedema no lymphadenopathy.  There is good hip range of motion which is full symmetric and asymptomatic and a normal ankle exam.   Physical exam of the right  knee reveals no effusion no redness.  The patient does have tenderness about the medial l joint line.  No tenderness about the lateral l joint line.  A negative bounce home and a positive l medial Torito.    Patient has a stable ligamentous exam.  The patient has a negative Lachman and negative anterior drawer and a negative pivot shift.  Quads are reasonable and symmetric bilaterally.  Calf is soft and nontender.  There is no overlying skin changes no lymphedema lymphadenopathy.  Patient has good hip range of motion full symmetric and asymptomatic and a normal ankle exam.  She has good distal pulses and sensation distally is intact            X-rays AP lateral merchant both knees were taken to evaluate his symptoms and compared to previous films he has severe degenerative change about the medial compartment on the left complete loss of joint space and varus alignment mild to moderate patellofemoral OA on the right he has mild patellofemoral OA otherwise good maintenance with joint space medially and laterally the left has progressed    Assessment:   Bilateral knee pain left is  degenerative plan is to proceed with an injection in the right he does well with injections on that too.  If if his symptoms should return the right might consider an MRI he knows he needs a knee replacement on the left  Plan: As above  Follow up as indicated.  Ice, elevate, and rest as needed.  Discussed conservative measures of pain control including ice, bracing.  Also talked about the importance of strengthening and maintaining ideal body weight    Kathrin Garza M.D.    Large Joint Arthrocentesis: R knee  Date/Time: 7/15/2024 2:30 PM  Consent given by: patient  Site marked: site marked  Timeout: Immediately prior to procedure a time out was called to verify the correct patient, procedure, equipment, support staff and site/side marked as required   Supporting Documentation  Indications: pain, joint swelling and diagnostic evaluation   Procedure Details  Location: knee - R knee  Preparation: Patient was prepped and draped in the usual sterile fashion  Needle gauge: 21G.  Medications administered: 1 mL methylPREDNISolone acetate 80 MG/ML; 2 mL lidocaine PF 1% 1 %  Patient tolerance: patient tolerated the procedure well with no immediate complications      Large Joint Arthrocentesis: L knee  Date/Time: 7/15/2024 2:31 PM  Consent given by: patient  Site marked: site marked  Timeout: Immediately prior to procedure a time out was called to verify the correct patient, procedure, equipment, support staff and site/side marked as required   Supporting Documentation  Indications: pain   Procedure Details  Location: knee - L knee  Preparation: Patient was prepped and draped in the usual sterile fashion  Needle gauge: 21G.  Medications administered: 1 mL methylPREDNISolone acetate 80 MG/ML; 2 mL lidocaine PF 1% 1 %  Patient tolerance: patient tolerated the procedure well with no immediate complications

## 2024-07-17 RX ORDER — METHYLPREDNISOLONE ACETATE 80 MG/ML
1 INJECTION, SUSPENSION INTRA-ARTICULAR; INTRALESIONAL; INTRAMUSCULAR; SOFT TISSUE
Status: COMPLETED | OUTPATIENT
Start: 2024-07-15 | End: 2024-07-15

## 2024-07-17 RX ORDER — LIDOCAINE HYDROCHLORIDE 10 MG/ML
2 INJECTION, SOLUTION EPIDURAL; INFILTRATION; INTRACAUDAL; PERINEURAL
Status: COMPLETED | OUTPATIENT
Start: 2024-07-15 | End: 2024-07-15

## 2024-12-04 ENCOUNTER — OFFICE VISIT (OUTPATIENT)
Dept: SPORTS MEDICINE | Facility: CLINIC | Age: 79
End: 2024-12-04
Payer: MEDICARE

## 2024-12-04 VITALS
OXYGEN SATURATION: 99 % | RESPIRATION RATE: 16 BRPM | HEIGHT: 65 IN | WEIGHT: 175 LBS | BODY MASS INDEX: 29.16 KG/M2 | TEMPERATURE: 98.4 F | HEART RATE: 70 BPM

## 2024-12-04 DIAGNOSIS — M16.11 PRIMARY OSTEOARTHRITIS OF RIGHT HIP: Primary | ICD-10-CM

## 2024-12-04 PROCEDURE — 20611 DRAIN/INJ JOINT/BURSA W/US: CPT | Performed by: FAMILY MEDICINE

## 2024-12-04 NOTE — PROGRESS NOTES
Here today for R hip injection. Having increasing hip and leg pain.     - Large Joint Arthrocentesis: R hip joint on 12/4/2024 2:48 PM  Indications: pain  Details: 22 G needle, ultrasound-guided anterior approach  Medications: 2 mL lidocaine 1 %, 1 mL triamcinolone acetonide 40 MG/ML  Outcome: tolerated well, no immediate complications  Procedure, treatment alternatives, risks and benefits explained, specific risks discussed. Immediately prior to procedure a time out was called to verify the correct patient, procedure, equipment, support staff and site/side marked as required. Patient was prepped and draped in the usual sterile fashion.           Diagnoses and all orders for this visit:    Primary osteoarthritis of right hip  -     - Large Joint Arthrocentesis

## 2025-02-24 ENCOUNTER — TRANSCRIBE ORDERS (OUTPATIENT)
Dept: ADMINISTRATIVE | Facility: HOSPITAL | Age: 80
End: 2025-02-24
Payer: MEDICARE

## 2025-02-24 DIAGNOSIS — R52 PAIN: Primary | ICD-10-CM

## 2025-03-05 ENCOUNTER — OFFICE VISIT (OUTPATIENT)
Dept: SPORTS MEDICINE | Facility: CLINIC | Age: 80
End: 2025-03-05
Payer: MEDICARE

## 2025-03-05 VITALS
OXYGEN SATURATION: 96 % | SYSTOLIC BLOOD PRESSURE: 134 MMHG | WEIGHT: 175 LBS | TEMPERATURE: 97.6 F | BODY MASS INDEX: 29.16 KG/M2 | HEART RATE: 64 BPM | HEIGHT: 65 IN | DIASTOLIC BLOOD PRESSURE: 72 MMHG

## 2025-03-05 DIAGNOSIS — M51.26 HERNIATED NUCLEUS PULPOSUS, L4-5 RIGHT: ICD-10-CM

## 2025-03-05 DIAGNOSIS — M51.362 DEGENERATION OF INTERVERTEBRAL DISC OF LUMBAR REGION WITH DISCOGENIC BACK PAIN AND LOWER EXTREMITY PAIN: ICD-10-CM

## 2025-03-05 DIAGNOSIS — M16.11 PRIMARY OSTEOARTHRITIS OF RIGHT HIP: Primary | ICD-10-CM

## 2025-03-05 DIAGNOSIS — M25.551 RIGHT HIP PAIN: ICD-10-CM

## 2025-03-05 PROCEDURE — 3078F DIAST BP <80 MM HG: CPT | Performed by: FAMILY MEDICINE

## 2025-03-05 PROCEDURE — 3075F SYST BP GE 130 - 139MM HG: CPT | Performed by: FAMILY MEDICINE

## 2025-03-05 PROCEDURE — 99213 OFFICE O/P EST LOW 20 MIN: CPT | Performed by: FAMILY MEDICINE

## 2025-03-05 NOTE — PROGRESS NOTES
"Jenaro is a 80 y.o. year old male     Chief Complaint   Patient presents with    Hip Pain     RIGHT HIP- Patient is here to follow up on his right hip, interested in repeat steroid injection.        History of Present Illness  History of Present Illness  The patient is here for follow-up on right hip pain.    No improvement after ultrasound-guided injection. Concerned about the cause. MRI of the hip scheduled. Pain is anterolateral, radiates down the leg, worsens with standing/walking, subsides with sitting.    I have reviewed the patient's medical, family, and social history in detail and updated the computerized patient record.    Review of Systems    /72 (BP Location: Right arm, Patient Position: Sitting, Cuff Size: Adult)   Pulse 64   Temp 97.6 °F (36.4 °C) (Temporal)   Ht 165.1 cm (65\")   Wt 79.4 kg (175 lb)   SpO2 96%   BMI 29.12 kg/m²      Physical Exam    Vital signs reviewed.   General: No acute distress.      Physical Exam  Right hip and leg appear normal. Mild tenderness over proximal iliopsoas and sartorius. No tenderness in greater trochanter. Well-preserved range of motion. Negative impingement, FADIR, ANABELLA, Stinchfield, straight leg raise, and slump maneuvers. Symmetric sensation to light touch and strength in knee flexion/extension, ankle dorsiflexion/plantar flexion. Patellar reflexes 1+ and symmetric, Achilles reflexes absent symmetrically.    Results  Results  Imaging  X-rays of the right hip in late 2023 showed mild hip arthritis. 2015 lumbar spine MRI showed significant degenerative disease with L4-L5 disc bulge impinging on right L5 nerve root.    Procedures     Diagnoses and all orders for this visit:    Primary osteoarthritis of right hip    Right hip pain  -     MRI Lumbar Spine Without Contrast; Future    Degeneration of intervertebral disc of lumbar region with discogenic back pain and lower extremity pain  -     MRI Lumbar Spine Without Contrast; Future    Herniated nucleus " pulposus, L4-5 right  -     MRI Lumbar Spine Without Contrast; Future      Assessment & Plan   I had a discussion with the patient regarding considerations here overall my concern is that his hip is not the actual source of his pain but based on his history and previous MRI I'm really concerned this is actually a lumbar origin pain. I agree with following up with the MRI of the hip to get a better look as he may have some other pathology there maybe even some bursitis of the iliopsoas, but I think we need to obtain an MRI of the lumbar spine for better evaluation there, specifically for right sided neural foraminal impingement at the L4-5 level. We will follow up based on that finding. We discussed consideration for other options. He declines any other anti-inflammatory medications as typically his pain is relieved by sitting. We may need to plan. Some formal physical therapy or interventional pain management strategies. Next, we'll follow up after his MRI.     Patient or patient representative verbalized consent for the use of Ambient Listening during the visit with  Humza Ruiz MD for chart documentation. 3/5/2025  14:35 EST    *Dictated after leaving exam room.     Humza Ruiz MD   14:34 EST   03/05/25

## 2025-03-20 ENCOUNTER — HOSPITAL ENCOUNTER (OUTPATIENT)
Dept: MRI IMAGING | Facility: HOSPITAL | Age: 80
Discharge: HOME | End: 2025-03-20
Admitting: INTERNAL MEDICINE
Payer: MEDICARE

## 2025-03-20 DIAGNOSIS — R52 PAIN: ICD-10-CM

## 2025-03-20 PROCEDURE — 73721 MRI JNT OF LWR EXTRE W/O DYE: CPT

## 2025-05-06 ENCOUNTER — OFFICE VISIT (OUTPATIENT)
Dept: ORTHOPEDIC SURGERY | Facility: CLINIC | Age: 80
End: 2025-05-06
Payer: MEDICARE

## 2025-05-06 VITALS — WEIGHT: 165 LBS | HEIGHT: 65 IN | BODY MASS INDEX: 27.49 KG/M2 | TEMPERATURE: 97.7 F

## 2025-05-06 DIAGNOSIS — M51.362 DEGENERATION OF INTERVERTEBRAL DISC OF LUMBAR REGION WITH DISCOGENIC BACK PAIN AND LOWER EXTREMITY PAIN: ICD-10-CM

## 2025-05-06 DIAGNOSIS — R52 PAIN: Primary | ICD-10-CM

## 2025-05-06 PROCEDURE — 99214 OFFICE O/P EST MOD 30 MIN: CPT | Performed by: ORTHOPAEDIC SURGERY

## 2025-05-06 RX ORDER — TAMSULOSIN HYDROCHLORIDE 0.4 MG/1
1 CAPSULE ORAL EVERY 12 HOURS SCHEDULED
COMMUNITY
Start: 2025-04-17

## 2025-05-06 NOTE — PROGRESS NOTES
"Patient: Jenaro Haji Jr.  YOB: 1945 80 y.o. male  Medical Record Number: [id number]    Chief Complaints: Right-sided posterior hip and leg pain    History of Present Illness:Jenaro Haji Jr. is a 80 y.o. male who presents with pain in the posterior right hip lateral right hip distal anterior femur area and into the right lateral calf worsened over the last few months.  He has seen Dr. Ruiz had an MRI of his hip and back and ultrasound-guided of the injection on the right hip and did not have any improvement with that.  The pain is now significant and is limiting his ability to ambulate.    Allergies:   Allergies   Allergen Reactions    Iodinated Contrast Media Unknown (See Comments)    Contrast Dye (Echo Or Unknown Ct/Mr) Hives, Itching and Other (See Comments)     \"felt like something in my throat.\"       Medications:   Current Outpatient Medications   Medication Sig Dispense Refill    aspirin 81 MG EC tablet Take 1 tablet by mouth Daily.      atorvastatin (LIPITOR) 40 MG tablet Take 1 tablet by mouth Daily.      finasteride (PROSCAR) 5 MG tablet Take 1 tablet by mouth every night at bedtime.      glipiZIDE (GLUCOTROL) 10 MG tablet TAKE 1 TABLET TWICE DAILY 180 tablet 2    levothyroxine (SYNTHROID, LEVOTHROID) 75 MCG tablet Take 1 tablet by mouth Daily.      liothyronine (CYTOMEL) 5 MCG tablet Take 1 tablet by mouth Daily.      losartan-hydrochlorothiazide (HYZAAR) 100-25 MG per tablet Take 1 tablet by mouth Daily.      metFORMIN (GLUCOPHAGE) 1000 MG tablet TAKE 1 TABLET TWICE DAILY WITH MEALS 180 tablet 2    multivitamin with minerals tablet tablet Take 1 tablet by mouth Daily.      RELION CONFIRM/MICRO TEST test strip CHECK BLOOD SUGAR ONCE DAILY 100 each 3    tamsulosin (FLOMAX) 0.4 MG capsule 24 hr capsule Take 1 capsule by mouth Every 12 (Twelve) Hours.       No current facility-administered medications for this visit.         The following portions of the patient's history were reviewed " "and updated as appropriate: allergies, current medications, past family history, past medical history, past social history, past surgical history and problem list.    Review of Systems:   Pertinent positives/negative listed in HPI above    Physical Exam:   Vitals:    05/06/25 1641   Temp: 97.7 °F (36.5 °C)   TempSrc: Temporal   Weight: 74.8 kg (165 lb)   Height: 165.1 cm (65\")   PainSc: 1    PainLoc: Hip       General: A and O x 3, ASA, NAD   There is no pain with hip range of motion negative logroll negative Stinchfield hip motion is symmetric and smooth without irritability.  There is mild tenderness palpation about the right hip bursa.  He walks with an antalgic gait with a lumbar asymmetry.  He is intact to light touch distally          Radiology:  Xrays 2views right hip (AP bilateral hips, and lateral of the hip) ordered and reviewed for evaluation of hip pain  demonstrating no abnormality of the hip.  He does have severe degenerative changes of the lower lumbar spine    MRI of the right hip reviewed which shows evidence of arthritis however my interpretation is there is not significant hip arthritis perhaps mild.    MRI of the lumbar spine reviewed which shows severe lumbar degenerative changes versus with severe right sided L4-5 stenosis    Assessment/Plan: right sided hip and leg pain -  I think most likely this is lumbar radiuculopathy. Will set up lumvbar RUI and PT, RTO PRN      Diagnoses and all orders for this visit:    1. Pain (Primary)  -     XR Hip With or Without Pelvis 2 - 3 View Right        Camacho Lara MD  5/6/2025  "

## 2025-05-07 ENCOUNTER — TELEPHONE (OUTPATIENT)
Dept: ORTHOPEDIC SURGERY | Facility: CLINIC | Age: 80
End: 2025-05-07
Payer: MEDICARE

## 2025-05-07 DIAGNOSIS — R52 PAIN: ICD-10-CM

## 2025-05-07 DIAGNOSIS — M51.362 DEGENERATION OF INTERVERTEBRAL DISC OF LUMBAR REGION WITH DISCOGENIC BACK PAIN AND LOWER EXTREMITY PAIN: Primary | ICD-10-CM

## 2025-05-07 NOTE — TELEPHONE ENCOUNTER
"Re: Epidural order     Pain Management is requesting the following...      PATIENT IS NOT ESTABLISHED WITH OUR CLINIC. PATIENT WILL NEED A REFERRAL TO \"[facility]\" FOR CONSULT FIRST BEFORE ANY PROCEDURES. IT LOOKS LIKE THERE WAS A REFERRAL IN MARCH THAT WAS APPROVED TO SCHEDULE BUT PATIENT REFUSED.     WE WILL NEED A PAIN REFERRAL TO  [facility] PLEASE. THIS REFERRAL IS FOR A PROCEDURE.         "

## 2025-05-13 NOTE — PROGRESS NOTES
"  Patient: Jenaro Haji Jr.  YOB: 1945  Date of Service: 5/13/2025    Chief Complaints: Bilateral knee pain    Subjective:    History of Present Illness: Pt is seen in the office today with complaints of bilateral knee pain have seen him in the past for his knees the last time was almost a year ago he now has an acute on chronic issue he has been seeing Dr. Lara for his hip he also has a bad back both knees are bothering him left seems to be a little worse today than the right he is a diabetic non-insulin-dependent        Allergies:   Allergies   Allergen Reactions    Iodinated Contrast Media Unknown (See Comments)    Contrast Dye (Echo Or Unknown Ct/Mr) Hives, Itching and Other (See Comments)     \"felt like something in my throat.\"       Medications:   Home Medications:  Current Outpatient Medications on File Prior to Visit   Medication Sig    aspirin 81 MG EC tablet Take 1 tablet by mouth Daily.    atorvastatin (LIPITOR) 40 MG tablet Take 1 tablet by mouth Daily.    finasteride (PROSCAR) 5 MG tablet Take 1 tablet by mouth every night at bedtime.    glipiZIDE (GLUCOTROL) 10 MG tablet TAKE 1 TABLET TWICE DAILY    levothyroxine (SYNTHROID, LEVOTHROID) 75 MCG tablet Take 1 tablet by mouth Daily.    liothyronine (CYTOMEL) 5 MCG tablet Take 1 tablet by mouth Daily.    losartan-hydrochlorothiazide (HYZAAR) 100-25 MG per tablet Take 1 tablet by mouth Daily.    metFORMIN (GLUCOPHAGE) 1000 MG tablet TAKE 1 TABLET TWICE DAILY WITH MEALS    multivitamin with minerals tablet tablet Take 1 tablet by mouth Daily.    RELION CONFIRM/MICRO TEST test strip CHECK BLOOD SUGAR ONCE DAILY    tamsulosin (FLOMAX) 0.4 MG capsule 24 hr capsule Take 1 capsule by mouth Every 12 (Twelve) Hours.     No current facility-administered medications on file prior to visit.     Current Medications:  Scheduled Meds:  Continuous Infusions:No current facility-administered medications for this visit.    PRN Meds:.    I have reviewed the " patient's medical history in detail and updated the computerized patient record.  Review and summarization of old records include:    Past Medical History:   Diagnosis Date    Allergic ???    IVP dye    Arthritis     Cancer     skin    Cataract     Cataracts, bilateral     Chronic kidney disease     kidney stones    CTS (carpal tunnel syndrome)     Diabetes mellitus 2005    seams under comtrol    Dislocation of finger ?1970's    ED (erectile dysfunction) 11/23/2016    Erectile dysfunction     Headache     Hip arthrosis 2023 ????    HL (hearing loss)     Hyperlipidemia     Hypertension 10 yrs ago    Kidney stone     Knee sprain     Knee swelling 2001??    Low back strain maybe    Lumbar spinal stenosis     Nocturia     Non-insulin dependent type 2 diabetes mellitus     Pain in both wrists     Shortness of breath     Tear of meniscus of knee ? 2000    Tennis elbow ? 1960's    Thyroid disease     hypothryoid    Urinary tract infection     Visual impairment         Past Surgical History:   Procedure Laterality Date    CATARACT EXTRACTION Right 01/04/2024    COLONOSCOPY  03/18/2011    COLONOSCOPY N/A 11/17/2017    Procedure: COLONOSCOPY TO CECUM/TI WITH POLYPECTOMY ( COLD BX);  Surgeon: Kenan Galindo MD;  Location: [facility] ENDOSCOPY;  Service:     COLONOSCOPY N/A 08/08/2022    Procedure: COLONOSCOPY into cecum and terminal ileum;  Surgeon: Kenan Galindo MD;  Location: [facility] ENDOSCOPY;  Service: Gastroenterology;  Laterality: N/A;  pre: personal hx of colon polyps   post: diverticulosis     HAND SURGERY  Carpal tunnel release    both hands    HERNIA REPAIR      INGUINAL HERNIA REPAIR      KNEE SURGERY  broke Quad tendon    QUADRICEPS TENDON REPAIR Right 01/18/2018    Procedure: QUADRICEPS TENDON REPAIR;  Surgeon: Brett Mclaughlin MD;  Location: [facility] OR;  Service:     SKIN CANCER EXCISION Left     hand    TRIGGER POINT INJECTION  july 2024        Social History     Occupational History    Not on  "file   Tobacco Use    Smoking status: Former     Current packs/day: 0.00     Average packs/day: 1.6 packs/day for 62.5 years (100.0 ttl pk-yrs)     Types: Cigarettes, Cigars     Start date: 1/1/1960     Quit date: 1/1/2010     Years since quitting: 15.3     Passive exposure: Past    Smokeless tobacco: Never   Vaping Use    Vaping status: Never Used   Substance and Sexual Activity    Alcohol use: Yes     Alcohol/week: 4.0 standard drinks of alcohol     Types: 1 Glasses of wine, 3 Drinks containing 0.5 oz of alcohol per week     Comment: social drinking    Drug use: No    Sexual activity: Not Currently     Partners: Female      Social History     Social History Narrative    Not on file        Family History   Problem Relation Age of Onset    Cancer Mother     Diabetes Mother     Arthritis Mother     Vision loss Mother     Aortic aneurysm Father         Abdominal    Coronary artery disease Father     Heart attack Father         minor    Hypertension Father     Sudden death Father     Arthritis Father     Hyperlipidemia Father     Vision loss Father     Stroke Paternal Uncle     Malig Hyperthermia Neg Hx        ROS: 14 point review of systems was performed and was negative except for documented findings in HPI and today's encounter.     Allergies:   Allergies   Allergen Reactions    Iodinated Contrast Media Unknown (See Comments)    Contrast Dye (Echo Or Unknown Ct/Mr) Hives, Itching and Other (See Comments)     \"felt like something in my throat.\"     Constitutional:  Denies fever, shaking or chills   Eyes:  Denies change in visual acuity   HENT:  Denies nasal congestion or sore throat   Respiratory:  Denies cough or shortness of breath   Cardiovascular:  Denies chest pain or severe LE edema   GI:  Denies abdominal pain, nausea, vomiting, bloody stools or diarrhea   Musculoskeletal:  Numbness, tingling, or loss of motor function only as noted above in history of present illness.  : Denies painful urination or " hematuria  Integument:  Denies rash, lesion or ulceration   Neurologic:  Denies headache or focal weakness  Endocrine:  Denies lymphadenopathy  Psych:  Denies confusion or change in mental status   Hem:  Denies active bleeding      Physical Exam: 80 y.o. male  Wt Readings from Last 3 Encounters:   05/06/25 74.8 kg (165 lb)   03/05/25 79.4 kg (175 lb)   12/04/24 79.4 kg (175 lb)       There is no height or weight on file to calculate BMI.    There were no vitals filed for this visit.  Vital signs reviewed.   General Appearance:    Alert, cooperative, in no acute distress                    Ortho exam      Physical exam of the left knee reveals no effusion, no erythema.  It mild loss of extension and full flexion  Patient has mild varus alignment.  They have mild tenderness to palpation about the medial compartment, no tenderness laterally..  The patient has a negative bounce home, negative Torito and a stable ligamentous exam.  Quad tone is reasonable and symmetric.  There are no overlying skin changes no lymphedema no lymphadenopathy.  There is good hip range of motion which is full symmetric and asymptomatic and a normal ankle exam.   Physical exam of the right knee reveals no effusion, no erythema.  The patient has no palpable tenderness along the medial joint line, no tenderness about the lateral joint line.  Patient does have crepitus with patellofemoral range of motion.  They also have subjective symptoms anteriorly during exam.  The patient has a negative bounce home, negative Torito and a stable ligamentous exam.  Quad tone is reasonable and symmetric.  There are no overlying skin changes no lymphedema no lymphadenopathy.  There is good hip range of motion which is full symmetric and asymptomatic and a normal ankle exam.  Hamstrings and IT band are tight bilaterally.          I did review x-rays he has severe medial compartment OA on the left he has patellofemoral OA on the right    Assessment: Bilateral  knee pain I really think it is patellofemoral on the right primary osteoarthritis medially on the left he has done well with injections in the past he is not ready for anything surgical    Plan: Plan is to proceed with injections he is a diabetic he knows his risk are higher with an injection into but he knows to watch the sugars  Follow up as indicated.  Ice, elevate, and rest as needed.  Discussed conservative measures of pain control including ice, bracing.  Also talked about the importance of strengthening and maintaining ideal body weight  Large Joint Arthrocentesis: R knee  Date/Time: 5/16/2025 1:49 PM  Consent given by: patient  Site marked: site marked  Timeout: Immediately prior to procedure a time out was called to verify the correct patient, procedure, equipment, support staff and site/side marked as required   Supporting Documentation  Indications: pain   Procedure Details  Location: knee - R knee  Preparation: Patient was prepped and draped in the usual sterile fashion  Needle gauge: 21G.  Approach: anteromedial  Medications administered: 80 mg methylPREDNISolone acetate 40 MG/ML; 2 mL lidocaine PF 1% 1 %  Patient tolerance: patient tolerated the procedure well with no immediate complications      Large Joint Arthrocentesis: L knee  Date/Time: 5/16/2025 1:49 PM  Consent given by: patient  Site marked: site marked  Timeout: Immediately prior to procedure a time out was called to verify the correct patient, procedure, equipment, support staff and site/side marked as required   Supporting Documentation  Indications: pain   Procedure Details  Location: knee - L knee  Preparation: Patient was prepped and draped in the usual sterile fashion  Needle gauge: 21G.  Approach: anteromedial  Medications administered: 2 mL lidocaine PF 1% 1 %; 80 mg methylPREDNISolone acetate 40 MG/ML  Patient tolerance: patient tolerated the procedure well with no immediate complications      Kathrin Garza M.D.

## 2025-05-16 ENCOUNTER — OFFICE VISIT (OUTPATIENT)
Dept: ORTHOPEDIC SURGERY | Facility: CLINIC | Age: 80
End: 2025-05-16
Payer: MEDICARE

## 2025-05-16 VITALS — BODY MASS INDEX: 28.95 KG/M2 | HEIGHT: 63 IN | TEMPERATURE: 98.6 F | WEIGHT: 163.4 LBS

## 2025-05-16 DIAGNOSIS — M17.0 PRIMARY OSTEOARTHRITIS OF BOTH KNEES: Primary | ICD-10-CM

## 2025-05-16 RX ADMIN — METHYLPREDNISOLONE ACETATE 80 MG: 40 INJECTION, SUSPENSION INTRA-ARTICULAR; INTRALESIONAL; INTRAMUSCULAR; SOFT TISSUE at 13:49

## 2025-05-16 RX ADMIN — LIDOCAINE HYDROCHLORIDE 2 ML: 10 INJECTION, SOLUTION EPIDURAL; INFILTRATION; INTRACAUDAL; PERINEURAL at 13:49

## 2025-05-18 RX ORDER — METHYLPREDNISOLONE ACETATE 40 MG/ML
80 INJECTION, SUSPENSION INTRA-ARTICULAR; INTRALESIONAL; INTRAMUSCULAR; SOFT TISSUE
Status: COMPLETED | OUTPATIENT
Start: 2025-05-16 | End: 2025-05-16

## 2025-05-18 RX ORDER — LIDOCAINE HYDROCHLORIDE 10 MG/ML
2 INJECTION, SOLUTION EPIDURAL; INFILTRATION; INTRACAUDAL; PERINEURAL
Status: COMPLETED | OUTPATIENT
Start: 2025-05-16 | End: 2025-05-16

## 2025-06-04 ENCOUNTER — TELEPHONE (OUTPATIENT)
Dept: PAIN MEDICINE | Facility: CLINIC | Age: 80
End: 2025-06-04

## 2025-06-04 ENCOUNTER — OFFICE VISIT (OUTPATIENT)
Dept: PAIN MEDICINE | Facility: CLINIC | Age: 80
End: 2025-06-04
Payer: MEDICARE

## 2025-06-04 VITALS
WEIGHT: 163 LBS | TEMPERATURE: 97.6 F | HEIGHT: 63 IN | SYSTOLIC BLOOD PRESSURE: 128 MMHG | BODY MASS INDEX: 28.88 KG/M2 | HEART RATE: 85 BPM | OXYGEN SATURATION: 94 % | DIASTOLIC BLOOD PRESSURE: 86 MMHG | RESPIRATION RATE: 16 BRPM

## 2025-06-04 DIAGNOSIS — M54.16 LUMBAR RADICULOPATHY: Primary | ICD-10-CM

## 2025-06-04 DIAGNOSIS — M48.061 SPINAL STENOSIS OF LUMBAR REGION, UNSPECIFIED WHETHER NEUROGENIC CLAUDICATION PRESENT: ICD-10-CM

## 2025-06-04 PROCEDURE — 1159F MED LIST DOCD IN RCRD: CPT | Performed by: NURSE PRACTITIONER

## 2025-06-04 PROCEDURE — 1160F RVW MEDS BY RX/DR IN RCRD: CPT | Performed by: NURSE PRACTITIONER

## 2025-06-04 PROCEDURE — 1125F AMNT PAIN NOTED PAIN PRSNT: CPT | Performed by: NURSE PRACTITIONER

## 2025-06-04 PROCEDURE — 3074F SYST BP LT 130 MM HG: CPT | Performed by: NURSE PRACTITIONER

## 2025-06-04 PROCEDURE — 99203 OFFICE O/P NEW LOW 30 MIN: CPT | Performed by: NURSE PRACTITIONER

## 2025-06-04 PROCEDURE — 3079F DIAST BP 80-89 MM HG: CPT | Performed by: NURSE PRACTITIONER

## 2025-06-04 NOTE — PROGRESS NOTES
CHIEF COMPLAINT  Back pain    Subjective   Jenaro Haji Jr. is a 80 y.o. male.   He presents to the office for evaluation of back pain. He was referred here by Dr Camacho Lara.     Mr. Eason's back pain started 3 years ago without inciting event.      Today his pain is 1/10VAS in severity. His pain on average is 4/10VAS in severity. He notes that his pain fluctuates in severity. He has no back pain, his primary pain complaint is right leg pain. This pain is located in his right lateral hip into his right lateral thigh. He does feel like he has weakness in his legs. He states that his right leg pain is worsened with standing and twisting. His pain is improved by walking and sitting. He has not utilize any ice or heat. He is utilize OTC aleve which has been helpful to his pain.     He does note that his balance is not good and when he twists, turns around, or bends he will lose his balance.     He does get steroid injections in his knees with orthopedics--Dr. Kathrin Garza.     He does have a history of urinary incontinence which has been ongoing for about a year, he does have urinary frequency and sees urology for this. He does have bowel incontinence which is always associated with diarrhea. He does feel the urge to defecate. This has also been going on for about a year. He denies any saddle anesthesia.     Past pain medications: None     Current pain medications: OTC Aleve      Past therapies:  Physical Therapy: None, this has been ordered, he will start this when he returns from vacation  Chiropractor: None  Massage Therapy: None  TENS: None  Heat/Ice: None  Neck or back surgery: None  Past pain management: None     Previous Injections:   None    Back Pain  Chronicity:  Chronic  Onset:  More than 1 year ago  Frequency:  Constantly  Progression since onset: fluctuating in severity.  Pain location: minimal back pain.  Radiates to:  Right thigh (lateral hip and anterior thigh)  Pain-numeric:  1/10  Aggravated by:   Standing and twisting  Associated symptoms: no numbness and no weakness    Treatments tried:  NSAIDs       PEG Assessment   What number best describes your pain on average in the past week?4  What number best describes how, during the past week, pain has interfered with your enjoyment of life?6  What number best describes how, during the past week, pain has interfered with your general activity?  9    Current Outpatient Medications:     aspirin 81 MG EC tablet, Take 1 tablet by mouth Daily., Disp: , Rfl:     atorvastatin (LIPITOR) 40 MG tablet, Take 1 tablet by mouth Daily., Disp: , Rfl:     finasteride (PROSCAR) 5 MG tablet, Take 1 tablet by mouth every night at bedtime., Disp: , Rfl:     glipiZIDE (GLUCOTROL) 10 MG tablet, TAKE 1 TABLET TWICE DAILY, Disp: 180 tablet, Rfl: 2    levothyroxine (SYNTHROID, LEVOTHROID) 75 MCG tablet, Take 1 tablet by mouth Daily., Disp: , Rfl:     liothyronine (CYTOMEL) 5 MCG tablet, Take 1 tablet by mouth Daily., Disp: , Rfl:     losartan-hydrochlorothiazide (HYZAAR) 100-25 MG per tablet, Take 1 tablet by mouth Daily., Disp: , Rfl:     metFORMIN (GLUCOPHAGE) 1000 MG tablet, TAKE 1 TABLET TWICE DAILY WITH MEALS, Disp: 180 tablet, Rfl: 2    multivitamin with minerals tablet tablet, Take 1 tablet by mouth Daily., Disp: , Rfl:     RELION CONFIRM/MICRO TEST test strip, CHECK BLOOD SUGAR ONCE DAILY, Disp: 100 each, Rfl: 3    tamsulosin (FLOMAX) 0.4 MG capsule 24 hr capsule, Take 1 capsule by mouth Every 12 (Twelve) Hours., Disp: , Rfl:     The following portions of the patient's history were reviewed and updated as appropriate: allergies, current medications, past family history, past medical history, past social history, past surgical history, and problem list.      REVIEW OF PERTINENT MEDICAL DATA          Review of Systems   Constitutional:  Negative for activity change.   Gastrointestinal:  Positive for diarrhea. Negative for constipation.   Genitourinary:  Negative for difficulty  "urinating.   Musculoskeletal:  Positive for back pain.   Neurological:  Negative for weakness and numbness.   Psychiatric/Behavioral:  Negative for self-injury, sleep disturbance and suicidal ideas.      --  The aforementioned information the Chief Complaint section and above subjective data including any HPI data, and also the Review of Systems data, has been personally reviewed and affirmed.  --    Vitals:    06/04/25 1458   BP: 128/86   Pulse: 85   Resp: 16   Temp: 97.6 °F (36.4 °C)   SpO2: 94%   Weight: 73.9 kg (163 lb)   Height: 160 cm (63\")   PainSc: 1      Objective   Physical Exam  Vitals and nursing note reviewed.   Constitutional:       Appearance: Normal appearance. He is well-developed.   Eyes:      General: Lids are normal.   Cardiovascular:      Rate and Rhythm: Normal rate.   Pulmonary:      Effort: Pulmonary effort is normal.   Musculoskeletal:      Lumbar back: No tenderness or bony tenderness. Normal range of motion. Positive right straight leg raise test. Negative left straight leg raise test.   Neurological:      Mental Status: He is alert and oriented to person, place, and time.      Motor: No weakness.      Deep Tendon Reflexes:      Reflex Scores:       Patellar reflexes are 1+ on the right side and 1+ on the left side.       Achilles reflexes are 1+ on the right side and 1+ on the left side.     Comments:                                           Motor Function:                                  Right                        Left                           Iliopsoas:                     5                             5      Quadriceps:                 5                             5  Hamstrings:                 5                             5  Tibialis Anterior:          5                             5   Gastroc/Soleus:           5                             5      Psychiatric:         Attention and Perception: Attention normal.         Mood and Affect: Mood normal.         Speech: Speech " normal.         Behavior: Behavior normal.         Judgment: Judgment normal.       Assessment & Plan   Diagnoses and all orders for this visit:    1. Lumbar radiculopathy (Primary)    2. Spinal stenosis of lumbar region, unspecified whether neurogenic claudication present      --- Jenaro Haji Jr. reports a pain score of 1.  Given his pain assessment as noted, treatment options were discussed and the following options were decided upon as a follow-up plan to address the patient's pain: consideration of injections.    --- I spent 30 minutes caring for Jenaro on this date of service. This time includes time spent by me in the following activities: preparing for the visit, reviewing tests, performing a medically appropriate examination and/or evaluation, documenting information in the medical record, and obtaining a separately obtained history     --- I recommend he proceed with PT  --- If  he does not have success with PT then he will likely be a candidate for a Right L4 TFESI. I recommend he schedule a follow up with me about 4 weeks after he starts PT  --- He states that he is absolutely not interested in surgery.      HOWARD REPORT  As the clinician, I personally reviewed the HOWARD from 6/4/2025 while the patient was in the office today.    Dictated utilizing Dragon dictation.

## 2025-06-04 NOTE — TELEPHONE ENCOUNTER
Please contact patient's PCP and request most recent lab work. CBC, CMP/BMP, and HgA1c please. Thanks

## 2025-08-14 ENCOUNTER — OFFICE VISIT (OUTPATIENT)
Dept: ORTHOPEDIC SURGERY | Facility: CLINIC | Age: 80
End: 2025-08-14
Payer: MEDICARE

## 2025-08-14 VITALS — TEMPERATURE: 98.3 F | HEIGHT: 67 IN | BODY MASS INDEX: 25.47 KG/M2 | WEIGHT: 162.3 LBS

## 2025-08-14 DIAGNOSIS — M17.0 PRIMARY OSTEOARTHRITIS OF BOTH KNEES: Primary | ICD-10-CM

## 2025-08-14 RX ORDER — LIDOCAINE HYDROCHLORIDE 10 MG/ML
2 INJECTION, SOLUTION EPIDURAL; INFILTRATION; INTRACAUDAL; PERINEURAL
Status: COMPLETED | OUTPATIENT
Start: 2025-08-14 | End: 2025-08-14

## 2025-08-14 RX ORDER — METHYLPREDNISOLONE ACETATE 80 MG/ML
80 INJECTION, SUSPENSION INTRA-ARTICULAR; INTRALESIONAL; INTRAMUSCULAR; SOFT TISSUE
Status: COMPLETED | OUTPATIENT
Start: 2025-08-14 | End: 2025-08-14

## 2025-08-14 RX ORDER — METHYLPREDNISOLONE ACETATE 80 MG/ML
1 INJECTION, SUSPENSION INTRA-ARTICULAR; INTRALESIONAL; INTRAMUSCULAR; SOFT TISSUE
Status: COMPLETED | OUTPATIENT
Start: 2025-08-14 | End: 2025-08-14

## 2025-08-14 RX ADMIN — METHYLPREDNISOLONE ACETATE 1 ML: 80 INJECTION, SUSPENSION INTRA-ARTICULAR; INTRALESIONAL; INTRAMUSCULAR; SOFT TISSUE at 13:04

## 2025-08-14 RX ADMIN — LIDOCAINE HYDROCHLORIDE 2 ML: 10 INJECTION, SOLUTION EPIDURAL; INFILTRATION; INTRACAUDAL; PERINEURAL at 13:04

## 2025-08-14 RX ADMIN — METHYLPREDNISOLONE ACETATE 80 MG: 80 INJECTION, SUSPENSION INTRA-ARTICULAR; INTRALESIONAL; INTRAMUSCULAR; SOFT TISSUE at 13:04

## (undated) DEVICE — SOL ISO/ALC RUB 70PCT 4OZ

## (undated) DEVICE — SINGLE-USE BIOPSY FORCEPS: Brand: RADIAL JAW 4

## (undated) DEVICE — CANN NASL CO2 TRULINK W/O2 A/

## (undated) DEVICE — UNDERCAST PADDING: Brand: DEROYAL

## (undated) DEVICE — BNDG ELAS ELITE V/CLOSE 6IN 5YD LF STRL

## (undated) DEVICE — TBG 02 CRUSH RESIST LF CLR 7FT

## (undated) DEVICE — IMMOB KN 3PNL DLX CANVS 22IN BLU

## (undated) DEVICE — THE TORRENT IRRIGATION SCOPE CONNECTOR IS USED WITH THE TORRENT IRRIGATION TUBING TO PROVIDE IRRIGATION FLUIDS SUCH AS STERILE WATER DURING GASTROINTESTINAL ENDOSCOPIC PROCEDURES WHEN USED IN CONJUNCTION WITH AN IRRIGATION PUMP (OR ELECTROSURGICAL UNIT).: Brand: TORRENT

## (undated) DEVICE — GLV SURG TRIUMPH CLASSIC PF LTX 8 STRL

## (undated) DEVICE — SUT VIC 1 CT 36IN J959H

## (undated) DEVICE — SKIN PREP TRAY W/CHG: Brand: MEDLINE INDUSTRIES, INC.

## (undated) DEVICE — SPNG GZ WOVN 4X4IN 12PLY 10/BX STRL

## (undated) DEVICE — SENSR O2 OXIMAX FNGR A/ 18IN NONSTR

## (undated) DEVICE — ADAPT CLN BIOGUARD AIR/H2O DISP

## (undated) DEVICE — BNDG ESMARK STRL 6INX12FT LF

## (undated) DEVICE — CANN O2 ETCO2 FITS ALL CONN CO2 SMPL A/ 7IN DISP LF

## (undated) DEVICE — ENCORE® LATEX ORTHO SIZE 8, STERILE LATEX POWDER-FREE SURGICAL GLOVE: Brand: ENCORE

## (undated) DEVICE — LN SMPL CO2 SHTRM SD STREAM W/M LUER

## (undated) DEVICE — HEWSON SUTURE RETRIEVER: Brand: HEWSON SUTURE RETRIEVER

## (undated) DEVICE — GLV SURG BIOGEL LTX PF 8

## (undated) DEVICE — UNDYED BRAIDED (POLYGLACTIN 910), SYNTHETIC ABSORBABLE SUTURE: Brand: COATED VICRYL

## (undated) DEVICE — DISPOSABLE TOURNIQUET CUFF SINGLE BLADDER, SINGLE PORT AND QUICK CONNECT CONNECTOR: Brand: COLOR CUFF

## (undated) DEVICE — BNDG ELAS CO-FLEX SLF ADHR 4IN5YD LF STRL

## (undated) DEVICE — Device: Brand: DEFENDO AIR/WATER/SUCTION AND BIOPSY VALVE

## (undated) DEVICE — TOWEL,OR,DSP,ST,BLUE,STD,4/PK,20PK/CS: Brand: MEDLINE

## (undated) DEVICE — KT ORCA ORCAPOD DISP STRL

## (undated) DEVICE — PETROLATUM DRESSING. FINE MESH GAUZE IMPREGNATED WITH 3% BISMUTH TRIBROMOPHENATE  IN A PETROLATUM BLEND.: Brand: XEROFORM PETROLATUM

## (undated) DEVICE — T-DRAPE,EXTREMITY,STERILE: Brand: MEDLINE

## (undated) DEVICE — DRAPE,U/ SHT,SPLIT,PLAS,STERIL: Brand: MEDLINE

## (undated) DEVICE — APPL CHLORAPREP W/TINT 26ML ORNG

## (undated) DEVICE — TUBING, SUCTION, 1/4" X 10', STRAIGHT: Brand: MEDLINE

## (undated) DEVICE — 3M™ STERI-STRIP™ REINFORCED ADHESIVE SKIN CLOSURES, R1547, 1/2 IN X 4 IN (12 MM X 100 MM), 6 STRIPS/ENVELOPE: Brand: 3M™ STERI-STRIP™

## (undated) DEVICE — GOWN,PREVENTION PLUS,XLONG/XLARGE,STRL: Brand: MEDLINE

## (undated) DEVICE — PK ORTHO MAJ 40

## (undated) DEVICE — PAD GRND REM POLYHESIVE A/ DISP